# Patient Record
Sex: FEMALE | Race: WHITE | Employment: FULL TIME | URBAN - METROPOLITAN AREA
[De-identification: names, ages, dates, MRNs, and addresses within clinical notes are randomized per-mention and may not be internally consistent; named-entity substitution may affect disease eponyms.]

---

## 2021-12-08 PROBLEM — F41.9 ANXIETY: Status: ACTIVE | Noted: 2021-12-08

## 2021-12-08 PROBLEM — D68.00 VON WILLEBRAND'S DISEASE: Status: ACTIVE | Noted: 2021-12-08

## 2021-12-08 PROBLEM — Z34.80 ENCOUNTER FOR SUPERVISION OF OTHER NORMAL PREGNANCY, UNSPECIFIED TRIMESTER: Status: ACTIVE | Noted: 2021-12-08

## 2021-12-08 PROBLEM — Z15.09 BRCA GENE POSITIVE: Status: ACTIVE | Noted: 2021-12-08

## 2021-12-08 PROBLEM — Z15.01 BRCA GENE POSITIVE: Status: ACTIVE | Noted: 2021-12-08

## 2021-12-08 PROBLEM — Z67.91 RH NEGATIVE STATE IN ANTEPARTUM PERIOD: Status: ACTIVE | Noted: 2021-12-08

## 2021-12-08 PROBLEM — O26.899 RH NEGATIVE STATE IN ANTEPARTUM PERIOD: Status: ACTIVE | Noted: 2021-12-08

## 2021-12-08 PROBLEM — Z87.442 HISTORY OF KIDNEY STONES: Status: ACTIVE | Noted: 2021-12-08

## 2022-02-02 PROBLEM — O43.192: Status: ACTIVE | Noted: 2022-02-02

## 2022-03-15 PROBLEM — D68.00 VON WILLEBRAND'S DISEASE (HCC): Chronic | Status: ACTIVE | Noted: 2021-12-08

## 2022-04-07 PROBLEM — Z34.93 PRENATAL CARE IN THIRD TRIMESTER: Status: ACTIVE | Noted: 2021-12-08

## 2022-04-07 PROBLEM — O43.192 MARGINAL INSERTION OF UMBILICAL CORD AFFECTING MANAGEMENT OF MOTHER IN SECOND TRIMESTER: Chronic | Status: ACTIVE | Noted: 2022-02-02

## 2022-04-15 PROBLEM — R19.5 CHANGE IN CONSISTENCY OF STOOL: Status: ACTIVE | Noted: 2022-04-15

## 2022-11-07 NOTE — PROGRESS NOTES
[facility]  Department of Family Medicine      Patient:  Carlos Zeng 32 y.o. female     Date of Service: 11/7/22      Chief complaint:   Chief Complaint   Patient presents with    Establish Care         History ofPresent Illness   The patient is a 32 y.o. female  presented to the clinic with complaints as above. NP    High risk breast cancer, following with gen surg for this, ordered MRI   -does want to establish with Ob/gyn as needs us q6 months on her ovaries     Hx of von willebrand disease follows with hem onc     Hx of narcolepsy, was on modafinil for this which did help, states she didn't like the side effects and grew a tolerance to this     Works as     States she is having issues with her sugar as she feels like it drops as she feels shaky and dizzy     Past Medical History:      Diagnosis Date    Anxiety 12/8/2021    BRCA2 gene mutation positive 03/2020    GERD (gastroesophageal reflux disease)     History of blood transfusion     tranfused x 6 for von willebrands    IBS (irritable bowel syndrome)     Kidney stone 2016    Psychiatric problem     took effexor and lexapro in past for anxiety    Von Willebrand disease     type 2        PastSurgical History:        Procedure Laterality Date    KIDNEY SURGERY Right     2014 - stent     TONSILLECTOMY      and adenoids     WISDOM TOOTH EXTRACTION         Allergies:    Patient has no known allergies.     Social History:   Social History     Socioeconomic History    Marital status:      Spouse name: Not on file    Number of children: Not on file    Years of education: Not on file    Highest education level: Not on file   Occupational History    Not on file   Tobacco Use    Smoking status: Never    Smokeless tobacco: Never   Substance and Sexual Activity    Alcohol use: Not Currently     Alcohol/week: 0.0 standard drinks    Drug use: No    Sexual activity: Yes     Partners: Male   Other Topics Concern    Not on file   Social History Narrative    Not on file     Social Determinants of Health     Financial Resource Strain: Not on file   Food Insecurity: Not on file   Transportation Needs: Not on file   Physical Activity: Insufficiently Active    Days of Exercise per Week: 1 day    Minutes of Exercise per Session: 30 min   Stress: Not on file   Social Connections: Not on file   Intimate Partner Violence: Not At Risk    Fear of Current or Ex-Partner: No    Emotionally Abused: No    Physically Abused: No    Sexually Abused: No   Housing Stability: Not on file        Family History:       Problem Relation Age of Onset    Heart Disease Father     Prostate Cancer Father     Cancer Father 61        prostate    Breast Cancer Sister 48        pos BRCA 2     Other Sister 50        +brca 2    Other Sister         Positive BRCA 2    Cancer Maternal Grandfather         stomach    Pancreatic Cancer Maternal Aunt        Review of Systems:   Review of Systems - as above     Physical Exam   Vitals: /70   Pulse 71   Temp 97.1 °F (36.2 °C) (Infrared)   Ht 5' 2\" (1.575 m)   LMP 11/07/2022   SpO2 98%   BMI 27.98 kg/m²   Physical Exam  Constitutional:       Appearance: She is well-developed. HENT:      Head: Normocephalic. Cardiovascular:      Rate and Rhythm: Normal rate and regular rhythm. Heart sounds: Normal heart sounds. No murmur heard. Pulmonary:      Effort: Pulmonary effort is normal. No respiratory distress. Breath sounds: Normal breath sounds. No wheezing. Abdominal:      General: Bowel sounds are normal.      Palpations: Abdomen is soft. Musculoskeletal:         General: No tenderness. Normal range of motion. Skin:     General: Skin is warm and dry. Neurological:      Mental Status: She is alert and oriented to person, place, and time. Psychiatric:         Behavior: Behavior normal.           Assessment and Plan       1.  Annual physical exam  Needs lab work and screening as below  - Comprehensive Metabolic Panel; Future  - TSH; Future  - CBC with Auto Differential; Future  - Vitamin D 25 Hydroxy; Future  - Hemoglobin A1C; Future    2. BRCA gene positive  Following with specialists for this, however needs ob/gyn for US of ovaries and monitoring, thus will refer to ob/gyn as below  - Alex Marlow, Julisa Fox MD, OB/GYN, [facility]    3. Primary narcolepsy without cataplexy  Chronic issue  - Diagnosed by previous sleep study, OARS reviewed and follows with what patient is saying, her modafinil is , thus I refilled this and will refer to sleep specialist, advised patient keep an open mind to different treatment for this as she does not like the way she feels on modafinil but does take it to stay awake (adderall? ?)  -Will do further evaluation   - Shanon Cadena MD, Sleep Medicine, [facility]  - modafinil (PROVIGIL) 100 MG tablet; Take 0.5 tablets by mouth daily for 30 days. Dispense: 30 tablet; Refill: 0    4. Hyperglycemia  On previous lab work, will do further work up as patient is having feelings of hypoglycemia (thyroid? ??)  - Comprehensive Metabolic Panel; Future  - TSH; Future  - CBC with Auto Differential; Future  - Vitamin D 25 Hydroxy; Future  - Hemoglobin A1C; Future    Counseled regarding above diagnosis, including possible risks and complications,  especially if left uncontrolled. Counseled regarding the possible side effects, risks, benefits and alternatives to treatment;patient and/or guardian verbalizes understanding, agrees, feels comfortable with and wishes to proceed with above treatment plan. Call or go to [address] if symptoms worsen or persist. Advised patient to call with any new medication issues, and, as applicable, read all Rx info from pharmacy to assure aware of all possible risks and side effects of medicationbefore taking. Patient and/or guardian given opportunity to ask questions/raise concerns.   The patient verbalized comfort and understanding of instructions. I encourage further reading and education about your health conditions. Information on many health conditions is provided by [facility] [employer]: [email or web address]  Please bring any questions to me at your nextvisit. Return to Office: Return in about 3 months (around 2/8/2023) for F/u mood and shakiness (thyroid???) . Medication List:    Current Outpatient Medications   Medication Sig Dispense Refill    modafinil (PROVIGIL) 100 MG tablet Take 0.5 tablets by mouth daily for 30 days. 30 tablet 0    Multiple Vitamin (MULTIVITAMIN ADULT PO) Take by mouth       No current facility-administered medications for this visit. Marilee Whitmore, DO       This document may have been prepared at least partially through the use of voice recognition software. Although effort is taken to assure the accuracy ofthis document, it is possible that grammatical, syntax,  or spelling errors may occur.

## 2022-11-08 ENCOUNTER — OFFICE VISIT (OUTPATIENT)
Dept: PRIMARY CARE CLINIC | Age: 27
End: 2022-11-08
Payer: COMMERCIAL

## 2022-11-08 VITALS
DIASTOLIC BLOOD PRESSURE: 70 MMHG | BODY MASS INDEX: 27.98 KG/M2 | SYSTOLIC BLOOD PRESSURE: 112 MMHG | TEMPERATURE: 97.1 F | HEART RATE: 71 BPM | OXYGEN SATURATION: 98 % | HEIGHT: 62 IN

## 2022-11-08 DIAGNOSIS — Z00.00 ANNUAL PHYSICAL EXAM: Primary | ICD-10-CM

## 2022-11-08 DIAGNOSIS — Z15.09 BRCA GENE POSITIVE: ICD-10-CM

## 2022-11-08 DIAGNOSIS — Z00.00 ANNUAL PHYSICAL EXAM: ICD-10-CM

## 2022-11-08 DIAGNOSIS — G47.419 PRIMARY NARCOLEPSY WITHOUT CATAPLEXY: ICD-10-CM

## 2022-11-08 DIAGNOSIS — Z15.01 BRCA GENE POSITIVE: ICD-10-CM

## 2022-11-08 DIAGNOSIS — R73.9 HYPERGLYCEMIA: ICD-10-CM

## 2022-11-08 LAB
BASOPHILS ABSOLUTE: 0.03 E9/L (ref 0–0.2)
BASOPHILS RELATIVE PERCENT: 0.5 % (ref 0–2)
EOSINOPHILS ABSOLUTE: 0.13 E9/L (ref 0.05–0.5)
EOSINOPHILS RELATIVE PERCENT: 2.4 % (ref 0–6)
HBA1C MFR BLD: 4.9 % (ref 4–5.6)
HCT VFR BLD CALC: 45.1 % (ref 34–48)
HEMOGLOBIN: 13.9 G/DL (ref 11.5–15.5)
IMMATURE GRANULOCYTES #: 0.02 E9/L
IMMATURE GRANULOCYTES %: 0.4 % (ref 0–5)
LYMPHOCYTES ABSOLUTE: 1.81 E9/L (ref 1.5–4)
LYMPHOCYTES RELATIVE PERCENT: 33.2 % (ref 20–42)
MCH RBC QN AUTO: 28 PG (ref 26–35)
MCHC RBC AUTO-ENTMCNC: 30.8 % (ref 32–34.5)
MCV RBC AUTO: 90.7 FL (ref 80–99.9)
MONOCYTES ABSOLUTE: 0.36 E9/L (ref 0.1–0.95)
MONOCYTES RELATIVE PERCENT: 6.6 % (ref 2–12)
NEUTROPHILS ABSOLUTE: 3.11 E9/L (ref 1.8–7.3)
NEUTROPHILS RELATIVE PERCENT: 56.9 % (ref 43–80)
PDW BLD-RTO: 13.5 FL (ref 11.5–15)
PLATELET # BLD: 284 E9/L (ref 130–450)
PMV BLD AUTO: 10.3 FL (ref 7–12)
RBC # BLD: 4.97 E12/L (ref 3.5–5.5)
WBC # BLD: 5.5 E9/L (ref 4.5–11.5)

## 2022-11-08 PROCEDURE — G8484 FLU IMMUNIZE NO ADMIN: HCPCS | Performed by: STUDENT IN AN ORGANIZED HEALTH CARE EDUCATION/TRAINING PROGRAM

## 2022-11-08 PROCEDURE — 99385 PREV VISIT NEW AGE 18-39: CPT | Performed by: STUDENT IN AN ORGANIZED HEALTH CARE EDUCATION/TRAINING PROGRAM

## 2022-11-08 RX ORDER — MODAFINIL 100 MG/1
50 TABLET ORAL DAILY
Qty: 30 TABLET | Refills: 0 | Status: SHIPPED | OUTPATIENT
Start: 2022-11-08 | End: 2022-12-08

## 2022-11-08 SDOH — HEALTH STABILITY: PHYSICAL HEALTH: ON AVERAGE, HOW MANY MINUTES DO YOU ENGAGE IN EXERCISE AT THIS LEVEL?: 30 MIN

## 2022-11-08 SDOH — HEALTH STABILITY: PHYSICAL HEALTH: ON AVERAGE, HOW MANY DAYS PER WEEK DO YOU ENGAGE IN MODERATE TO STRENUOUS EXERCISE (LIKE A BRISK WALK)?: 1 DAY

## 2022-11-08 ASSESSMENT — PATIENT HEALTH QUESTIONNAIRE - PHQ9
SUM OF ALL RESPONSES TO PHQ QUESTIONS 1-9: 0
SUM OF ALL RESPONSES TO PHQ9 QUESTIONS 1 & 2: 0
SUM OF ALL RESPONSES TO PHQ QUESTIONS 1-9: 0
2. FEELING DOWN, DEPRESSED OR HOPELESS: 0
1. LITTLE INTEREST OR PLEASURE IN DOING THINGS: 0
SUM OF ALL RESPONSES TO PHQ QUESTIONS 1-9: 0
SUM OF ALL RESPONSES TO PHQ QUESTIONS 1-9: 0

## 2022-11-16 ENCOUNTER — HOSPITAL ENCOUNTER (OUTPATIENT)
Age: 27
Discharge: HOME | End: 2022-11-16

## 2022-11-16 LAB
ALBUMIN SERPL-MCNC: 4.5 G/DL (ref 3.5–5.2)
ALP BLD-CCNC: 74 U/L (ref 35–104)
ALT SERPL-CCNC: 15 U/L (ref 0–32)
ANION GAP SERPL CALCULATED.3IONS-SCNC: 15 MMOL/L (ref 7–16)
AST SERPL-CCNC: 20 U/L (ref 0–31)
BILIRUB SERPL-MCNC: 0.2 MG/DL (ref 0–1.2)
BUN BLDV-MCNC: 13 MG/DL (ref 6–20)
CALCIUM SERPL-MCNC: 9.3 MG/DL (ref 8.6–10.2)
CHLORIDE BLD-SCNC: 105 MMOL/L (ref 98–107)
CO2: 22 MMOL/L (ref 22–29)
CREAT SERPL-MCNC: 0.7 MG/DL (ref 0.5–1)
GFR SERPL CREATININE-BSD FRML MDRD: >60 ML/MIN/1.73
GLUCOSE BLD-MCNC: 81 MG/DL (ref 74–99)
POTASSIUM SERPL-SCNC: 4.5 MMOL/L (ref 3.5–5)
SODIUM BLD-SCNC: 142 MMOL/L (ref 132–146)
TOTAL PROTEIN: 7.3 G/DL (ref 6.4–8.3)
TSH SERPL DL<=0.05 MIU/L-ACNC: 1.07 UIU/ML (ref 0.27–4.2)
VITAMIN D 25-HYDROXY: 48 NG/ML (ref 30–100)

## 2023-01-02 ASSESSMENT — SOCIAL DETERMINANTS OF HEALTH (SDOH)

## 2023-01-04 ENCOUNTER — OFFICE VISIT (OUTPATIENT)
Dept: OBGYN | Age: 28
End: 2023-01-04
Payer: COMMERCIAL

## 2023-01-04 VITALS
SYSTOLIC BLOOD PRESSURE: 116 MMHG | HEART RATE: 87 BPM | WEIGHT: 151 LBS | HEIGHT: 62 IN | BODY MASS INDEX: 27.79 KG/M2 | DIASTOLIC BLOOD PRESSURE: 62 MMHG

## 2023-01-04 DIAGNOSIS — Z15.01 BRCA2 GENE MUTATION POSITIVE IN FEMALE: ICD-10-CM

## 2023-01-04 DIAGNOSIS — Z15.02 BRCA2 GENE MUTATION POSITIVE IN FEMALE: ICD-10-CM

## 2023-01-04 DIAGNOSIS — Z15.09 BRCA2 GENE MUTATION POSITIVE IN FEMALE: ICD-10-CM

## 2023-01-04 DIAGNOSIS — Z12.4 SCREENING FOR CERVICAL CANCER: Primary | ICD-10-CM

## 2023-01-04 DIAGNOSIS — N93.9 ABNORMAL UTERINE BLEEDING: ICD-10-CM

## 2023-01-04 DIAGNOSIS — Z01.419 ENCOUNTER FOR WELL WOMAN EXAM: ICD-10-CM

## 2023-01-04 PROCEDURE — G8484 FLU IMMUNIZE NO ADMIN: HCPCS | Performed by: OBSTETRICS & GYNECOLOGY

## 2023-01-04 PROCEDURE — 99385 PREV VISIT NEW AGE 18-39: CPT | Performed by: OBSTETRICS & GYNECOLOGY

## 2023-01-04 PROCEDURE — 99203 OFFICE O/P NEW LOW 30 MIN: CPT | Performed by: OBSTETRICS & GYNECOLOGY

## 2023-01-04 RX ORDER — TRANEXAMIC ACID 650 MG/1
1300 TABLET ORAL 3 TIMES DAILY
Qty: 30 TABLET | Refills: 11 | Status: SHIPPED | OUTPATIENT
Start: 2023-01-04 | End: 2023-01-09

## 2023-01-04 RX ORDER — MODAFINIL 100 MG/1
TABLET ORAL
COMMUNITY
Start: 2022-10-04

## 2023-01-04 NOTE — PATIENT INSTRUCTIONS
Please call the number below to schedule your imaging we've requested:  [phone], select option #1

## 2023-01-04 NOTE — PROGRESS NOTES
Yevgeniy George     Patient presents for annual exam.     Patient with BRCA2 mutation. She gets breast screening through a high risk breast surgeon at [facility]. She is scheduled for a breast MRI next month. Patient states her older sister was diagnosed with BRCA2 mutation when she was diagnosed with breast cancer at age 48. She had a sister who  of an overdose at age 43. On autopsy they found cancerous cells in her ovary. Discussed ovarian cancer screening with Ca [facility] and pelvic ultrasound every 6 months starting at age 27. Will order baseline scan now. Patient has heavy cycles. States she has [place]. She has tried multiple OCPs in the past and did not do well on them. She does not want a Mirena. Patient had been on lysteda in the past for control of bleeding. She would like to resume it. Discussed high dose NSAIDs as well.      Past Medical History:   Diagnosis Date    Anxiety 2021    BRCA2 gene mutation positive 2020    GERD (gastroesophageal reflux disease)     History of blood transfusion     tranfused x 6 for von willebrands    IBS (irritable bowel syndrome)     Kidney stone     Psychiatric problem     took effexor and lexapro in past for anxiety    Von Willebrand disease     type 2         Past Surgical History:   Procedure Laterality Date    KIDNEY SURGERY Right     2014 - stent     TONSILLECTOMY      and adenoids     WISDOM TOOTH EXTRACTION          Family History   Problem Relation Age of Onset    Heart Disease Father     Prostate Cancer Father     Cancer Father 61        prostate    Breast Cancer Sister 48        pos BRCA 2     Other Sister 50        +brca 2    Other Sister         Positive BRCA 2    Cancer Maternal Grandfather         stomach    Pancreatic Cancer Maternal Aunt           Current Outpatient Medications:     modafinil (PROVIGIL) 100 MG tablet, , Disp: , Rfl:     tranexamic acid (LYSTEDA) 650 MG TABS tablet, Take 2 tablets by mouth 3 times daily for 5 days Take at start of menses for 5 days. , Disp: 30 tablet, Rfl: 11    Multiple Vitamin (MULTIVITAMIN ADULT PO), Take by mouth, Disp: , Rfl:      No Known Allergies     Social History       Tobacco History       Smoking Status  Never      Smokeless Tobacco Use  Never              Alcohol History       Alcohol Use Status  Yes Drinks/Week  0 Standard drinks or equivalent per week Comment  occ              Drug Use       Drug Use Status  No              Sexual Activity       Sexually Active  Yes Partners  Male                     Vitals:    01/04/23 1411   BP: 116/62   Pulse: 87        Physical Exam:  General: pleasant, alert     Bre[facility]: [facility]     Pelvic exam: normal external genitalia, vulva, vagina, cervix, uterus and adnexa. No uterine or adnexal masses or tenderness. Mary Yo was seen today for new patient and irregular menses. Diagnoses and all orders for this visit:    Screening for cervical cancer  -     PAP SMEAR    Abnormal uterine bleeding  -     tranexamic acid (LYSTEDA) 650 MG TABS tablet; Take 2 tablets by mouth 3 times daily for 5 days Take at start of menses for 5 days. Encounter for well woman exam    BRCA2 gene mutation positive in female  -     US PELVIS COMPLETE; Future    Advised to call with questions or concerns. Return in about 1 year (around 1/4/2024) for Annual, or as needed.      Eric March MD

## 2023-01-04 NOTE — PROGRESS NOTES
New patient alert and pleasant with complaints of heavy bleeding   Here today for annual GYN exam.  Pelvic exam, pap smear obtained, labeled  and delivered to lab. Breast exam completed by doctor. Discharge instructions have been discussed with the patient. Patient advised to call our office with any questions or concerns. Voiced understanding.

## 2023-01-18 DIAGNOSIS — G47.419 NARCOLEPSY WITHOUT CATAPLEXY: ICD-10-CM

## 2023-01-19 ENCOUNTER — HOSPITAL ENCOUNTER (OUTPATIENT)
Dept: ULTRASOUND IMAGING | Age: 28
Discharge: HOME | End: 2023-01-21
Payer: COMMERCIAL

## 2023-01-19 DIAGNOSIS — Z15.02 BRCA2 GENE MUTATION POSITIVE IN FEMALE: ICD-10-CM

## 2023-01-19 DIAGNOSIS — Z15.09 BRCA2 GENE MUTATION POSITIVE IN FEMALE: ICD-10-CM

## 2023-01-19 DIAGNOSIS — Z15.01 BRCA2 GENE MUTATION POSITIVE IN FEMALE: ICD-10-CM

## 2023-01-19 PROCEDURE — 76856 US EXAM PELVIC COMPLETE: CPT

## 2023-01-19 RX ORDER — MODAFINIL 100 MG/1
TABLET ORAL
Qty: 15 TABLET | Refills: 1 | Status: SHIPPED | OUTPATIENT
Start: 2023-01-19 | End: 2023-02-18

## 2023-01-30 NOTE — PROGRESS NOTES
[facility]  Department of Family Medicine      Patient:  Abdelrahman Hernandez 32 y.o. female     Date of Service: 1/31/23        Chief complaint:   Chief Complaint   Patient presents with    3 Month Follow-Up           History ofPresent Illness   The patient is a 32 y.o. female  presented to the clinic with complaints as above. Narcolepsy  -f/u  -referred to sleep specialist, scheduled with them on 3/01/2023  -is taking modafinil PRN, it does help, taking this maybe 3-4 times a week     Mood  -f/u  -has anxiety and depression  -has been on antidepressants in the past, has been 2-3 years since being on them  -recently tried prozac, felt that it may have helped a little   -felt best on Effexor  -is interested in counseling   -gets very anxious, however can feel numb at times   -starts her new job February 20    Past Medical History:      Diagnosis Date    Anxiety 12/8/2021    BRCA2 gene mutation positive 03/2020    GERD (gastroesophageal reflux disease)     History of blood transfusion     tranfused x 6 for von willebrands    IBS (irritable bowel syndrome)     Kidney stone 2016    Psychiatric problem     took effexor and lexapro in past for anxiety    Von Willebrand disease     type 2        PastSurgical History:        Procedure Laterality Date    KIDNEY SURGERY Right     2014 - stent     TONSILLECTOMY      and adenoids     WISDOM TOOTH EXTRACTION         Allergies:    Patient has no known allergies.     Social History:   Social History     Socioeconomic History    Marital status:      Spouse name: Not on file    Number of children: Not on file    Years of education: Not on file    Highest education level: Not on file   Occupational History    Not on file   Tobacco Use    Smoking status: Never    Smokeless tobacco: Never   Vaping Use    Vaping Use: Never used   Substance and Sexual Activity    Alcohol use: Yes     Comment: occ    Drug use: No    Sexual activity: Yes     Partners: Male Other Topics Concern    Not on file   Social History Narrative    Not on file     Social Determinants of Health     Financial Resource Strain: Not on file   Food Insecurity: Not on file   Transportation Needs: Not on file   Physical Activity: Insufficiently Active    Days of Exercise per Week: 1 day    Minutes of Exercise per Session: 30 min   Stress: Not on file   Social Connections: Not on file   Intimate Partner Violence: Not At Risk    Fear of Current or Ex-Partner: No    Emotionally Abused: No    Physically Abused: No    Sexually Abused: No   Housing Stability: Not on file        Family History:       Problem Relation Age of Onset    Heart Disease Father     Prostate Cancer Father     Cancer Father 61        prostate    Breast Cancer Sister 48        pos BRCA 2     Other Sister 50        +brca 2    Other Sister         Positive BRCA 2    Cancer Maternal Grandfather         stomach    Pancreatic Cancer Maternal Aunt        Review of Systems:   Review of Systems - as above     Physical Exam   Vitals: /62   Pulse 62   Temp 96.9 °F (36.1 °C) (Infrared)   Resp 18   Ht 5' 2\" (1.575 m)   Wt 149 lb (67.6 kg)   SpO2 98%   BMI 27.25 kg/m²   Physical Exam  Constitutional:       Appearance: She is well-developed. HENT:      Head: Normocephalic. Cardiovascular:      Rate and Rhythm: Normal rate and regular rhythm. Heart sounds: Normal heart sounds. No murmur heard. Pulmonary:      Effort: Pulmonary effort is normal. No respiratory distress. Breath sounds: Normal breath sounds. No wheezing. Abdominal:      General: Bowel sounds are normal.      Palpations: Abdomen is soft. Musculoskeletal:         General: No tenderness. Normal range of motion. Skin:     General: Skin is warm and dry. Neurological:      Mental Status: She is alert and oriented to person, place, and time. Psychiatric:         Behavior: Behavior normal.           Assessment and Plan       1.  Narcolepsy without cataplexy  F/u of chronic issue  -stable, continue same    2. Anxiety  F/u of chronic issue  -Worsening given life stressors, wants to hold off on medications for now, advised she consider therapy after she starts her new job, will have close f/u in several months to further address as patient may be open to medication at that time     Counseled regarding above diagnosis, including possible risks and complications,  especially if left uncontrolled. Counseled regarding the possible side effects, risks, benefits and alternatives to treatment;patient and/or guardian verbalizes understanding, agrees, feels comfortable with and wishes to proceed with above treatment plan. Call or go to [address] if symptoms worsen or persist. Advised patient to call with any new medication issues, and, as applicable, read all Rx info from pharmacy to assure aware of all possible risks and side effects of medicationbefore taking. Patient and/or guardian given opportunity to ask questions/raise concerns. The patient verbalized comfort and understanding ofinstructions. I encourage further reading and education about your health conditions. Information on many health conditions is provided by [facility] [employer] of Family Physicians: [email or web address]  Please bring any questions to me at your nextvisit. Return to Office: Return in about 2 months (around 4/12/2023) for f/u mood . Medication List:    Current Outpatient Medications   Medication Sig Dispense Refill    modafinil (PROVIGIL) 100 MG tablet TAKE 0.5 TABLETS BY MOUTH DAILY FOR 30 DAYS. 15 tablet 1    tranexamic acid (LYSTEDA) 650 MG TABS tablet Take 2 tablets by mouth 3 times daily for 5 days Take at start of menses for 5 days. 30 tablet 11    Multiple Vitamin (MULTIVITAMIN ADULT PO) Take by mouth (Patient not taking: Reported on 1/31/2023)       No current facility-administered medications for this visit.         Romi Bray, DO       This document may have been prepared at least partially through the use of voice recognition software. Although effort is taken to assure the accuracy ofthis document, it is possible that grammatical, syntax,  or spelling errors may occur.

## 2023-01-31 ENCOUNTER — OFFICE VISIT (OUTPATIENT)
Dept: PRIMARY CARE CLINIC | Age: 28
End: 2023-01-31
Payer: COMMERCIAL

## 2023-01-31 VITALS
WEIGHT: 149 LBS | HEIGHT: 62 IN | BODY MASS INDEX: 27.42 KG/M2 | SYSTOLIC BLOOD PRESSURE: 104 MMHG | DIASTOLIC BLOOD PRESSURE: 62 MMHG | HEART RATE: 62 BPM | TEMPERATURE: 96.9 F | OXYGEN SATURATION: 98 % | RESPIRATION RATE: 18 BRPM

## 2023-01-31 DIAGNOSIS — F41.9 ANXIETY: ICD-10-CM

## 2023-01-31 DIAGNOSIS — G47.419 NARCOLEPSY WITHOUT CATAPLEXY: Primary | ICD-10-CM

## 2023-01-31 PROCEDURE — 1036F TOBACCO NON-USER: CPT | Performed by: STUDENT IN AN ORGANIZED HEALTH CARE EDUCATION/TRAINING PROGRAM

## 2023-01-31 PROCEDURE — 99214 OFFICE O/P EST MOD 30 MIN: CPT | Performed by: STUDENT IN AN ORGANIZED HEALTH CARE EDUCATION/TRAINING PROGRAM

## 2023-01-31 PROCEDURE — G8419 CALC BMI OUT NRM PARAM NOF/U: HCPCS | Performed by: STUDENT IN AN ORGANIZED HEALTH CARE EDUCATION/TRAINING PROGRAM

## 2023-01-31 PROCEDURE — G8427 DOCREV CUR MEDS BY ELIG CLIN: HCPCS | Performed by: STUDENT IN AN ORGANIZED HEALTH CARE EDUCATION/TRAINING PROGRAM

## 2023-01-31 PROCEDURE — G8484 FLU IMMUNIZE NO ADMIN: HCPCS | Performed by: STUDENT IN AN ORGANIZED HEALTH CARE EDUCATION/TRAINING PROGRAM

## 2023-01-31 ASSESSMENT — PATIENT HEALTH QUESTIONNAIRE - PHQ9
SUM OF ALL RESPONSES TO PHQ QUESTIONS 1-9: 18
SUM OF ALL RESPONSES TO PHQ QUESTIONS 1-9: 18
3. TROUBLE FALLING OR STAYING ASLEEP: 3
2. FEELING DOWN, DEPRESSED OR HOPELESS: 2
4. FEELING TIRED OR HAVING LITTLE ENERGY: 3
1. LITTLE INTEREST OR PLEASURE IN DOING THINGS: 2
9. THOUGHTS THAT YOU WOULD BE BETTER OFF DEAD, OR OF HURTING YOURSELF: 0
SUM OF ALL RESPONSES TO PHQ QUESTIONS 1-9: 18
6. FEELING BAD ABOUT YOURSELF - OR THAT YOU ARE A FAILURE OR HAVE LET YOURSELF OR YOUR FAMILY DOWN: 2
SUM OF ALL RESPONSES TO PHQ9 QUESTIONS 1 & 2: 4
5. POOR APPETITE OR OVEREATING: 2
8. MOVING OR SPEAKING SO SLOWLY THAT OTHER PEOPLE COULD HAVE NOTICED. OR THE OPPOSITE, BEING SO FIGETY OR RESTLESS THAT YOU HAVE BEEN MOVING AROUND A LOT MORE THAN USUAL: 2
7. TROUBLE CONCENTRATING ON THINGS, SUCH AS READING THE NEWSPAPER OR WATCHING TELEVISION: 2
SUM OF ALL RESPONSES TO PHQ QUESTIONS 1-9: 18
10. IF YOU CHECKED OFF ANY PROBLEMS, HOW DIFFICULT HAVE THESE PROBLEMS MADE IT FOR YOU TO DO YOUR WORK, TAKE CARE OF THINGS AT HOME, OR GET ALONG WITH OTHER PEOPLE: 1

## 2023-01-31 NOTE — PATIENT INSTRUCTIONS
[facility], [facility]    [address] [facility] [address], [place] [address]    [facility], [address]    Phone: [phone]    Fax: [phone]    Tara Watson MS.Ed., [address] [facility], [facility]. Alma Schreiber.    Phone: [phone]    Fax: [phone] also does the Psychiatric Case Management    [email or web address]         [facility] Emma Vick, [facility], [facility]    [address] [facility]    [phone]         [facility] [address] , [address], [facility], [address]    [phone]         [email or web address]

## 2023-04-06 PROBLEM — O43.192 MARGINAL INSERTION OF UMBILICAL CORD AFFECTING MANAGEMENT OF MOTHER IN SECOND TRIMESTER: Chronic | Status: RESOLVED | Noted: 2022-02-02 | Resolved: 2023-04-06

## 2023-04-06 PROBLEM — Z34.93 PRENATAL CARE IN THIRD TRIMESTER: Status: RESOLVED | Noted: 2021-12-08 | Resolved: 2023-04-06

## 2023-04-06 PROBLEM — R19.5 CHANGE IN CONSISTENCY OF STOOL: Status: RESOLVED | Noted: 2022-04-15 | Resolved: 2023-04-06

## 2023-09-03 ENCOUNTER — APPOINTMENT (OUTPATIENT)
Dept: CT IMAGING | Age: 28
End: 2023-09-03
Payer: COMMERCIAL

## 2023-09-03 ENCOUNTER — HOSPITAL ENCOUNTER (EMERGENCY)
Age: 28
Discharge: HOME | End: 2023-09-03
Attending: EMERGENCY MEDICINE
Payer: COMMERCIAL

## 2023-09-03 VITALS
TEMPERATURE: 97.9 F | DIASTOLIC BLOOD PRESSURE: 63 MMHG | HEART RATE: 57 BPM | OXYGEN SATURATION: 100 % | RESPIRATION RATE: 16 BRPM | SYSTOLIC BLOOD PRESSURE: 99 MMHG

## 2023-09-03 DIAGNOSIS — R10.9 LEFT FLANK PAIN: Primary | ICD-10-CM

## 2023-09-03 DIAGNOSIS — N20.0 KIDNEY STONE: ICD-10-CM

## 2023-09-03 DIAGNOSIS — R31.0 GROSS HEMATURIA: ICD-10-CM

## 2023-09-03 LAB
ANION GAP SERPL CALCULATED.3IONS-SCNC: 9 MMOL/L (ref 7–16)
BACTERIA URNS QL MICRO: ABNORMAL
BASOPHILS # BLD: 0.03 K/UL (ref 0–0.2)
BASOPHILS NFR BLD: 0 % (ref 0–2)
BILIRUB UR QL STRIP: NEGATIVE
BUN SERPL-MCNC: 9 MG/DL (ref 6–20)
CALCIUM SERPL-MCNC: 9.1 MG/DL (ref 8.6–10.2)
CHLORIDE SERPL-SCNC: 104 MMOL/L (ref 98–107)
CLARITY UR: CLEAR
CO2 SERPL-SCNC: 26 MMOL/L (ref 22–29)
COLOR UR: YELLOW
CREAT SERPL-MCNC: 0.8 MG/DL (ref 0.5–1)
EOSINOPHIL # BLD: 0.11 K/UL (ref 0.05–0.5)
EOSINOPHILS RELATIVE PERCENT: 2 % (ref 0–6)
EPI CELLS #/AREA URNS HPF: ABNORMAL /HPF
ERYTHROCYTE [DISTWIDTH] IN BLOOD BY AUTOMATED COUNT: 13 % (ref 11.5–15)
GFR SERPL CREATININE-BSD FRML MDRD: >60 ML/MIN/1.73M2
GLUCOSE SERPL-MCNC: 87 MG/DL (ref 74–99)
GLUCOSE UR STRIP-MCNC: NEGATIVE MG/DL
HCG, URINE, POC: NEGATIVE
HCT VFR BLD AUTO: 42.7 % (ref 34–48)
HGB BLD-MCNC: 13.4 G/DL (ref 11.5–15.5)
HGB UR QL STRIP.AUTO: ABNORMAL
IMM GRANULOCYTES # BLD AUTO: <0.03 K/UL (ref 0–0.58)
IMM GRANULOCYTES NFR BLD: 0 % (ref 0–5)
KETONES UR STRIP-MCNC: NEGATIVE MG/DL
LACTATE BLDV-SCNC: 0.7 MMOL/L (ref 0.5–2.2)
LEUKOCYTE ESTERASE UR QL STRIP: ABNORMAL
LYMPHOCYTES NFR BLD: 2.02 K/UL (ref 1.5–4)
LYMPHOCYTES RELATIVE PERCENT: 29 % (ref 20–42)
Lab: NORMAL
MCH RBC QN AUTO: 27.5 PG (ref 26–35)
MCHC RBC AUTO-ENTMCNC: 31.4 G/DL (ref 32–34.5)
MCV RBC AUTO: 87.5 FL (ref 80–99.9)
MONOCYTES NFR BLD: 0.49 K/UL (ref 0.1–0.95)
MONOCYTES NFR BLD: 7 % (ref 2–12)
NEGATIVE QC PASS/FAIL: NORMAL
NEUTROPHILS NFR BLD: 62 % (ref 43–80)
NEUTS SEG NFR BLD: 4.38 K/UL (ref 1.8–7.3)
NITRITE UR QL STRIP: NEGATIVE
PH UR STRIP: 6 [PH] (ref 5–9)
PLATELET # BLD AUTO: 251 K/UL (ref 130–450)
PMV BLD AUTO: 10 FL (ref 7–12)
POSITIVE QC PASS/FAIL: NORMAL
POTASSIUM SERPL-SCNC: 4.2 MMOL/L (ref 3.5–5)
PROT UR STRIP-MCNC: NEGATIVE MG/DL
RBC # BLD AUTO: 4.88 M/UL (ref 3.5–5.5)
RBC #/AREA URNS HPF: ABNORMAL /HPF
SODIUM SERPL-SCNC: 139 MMOL/L (ref 132–146)
SP GR UR STRIP: <1.005 (ref 1–1.03)
UROBILINOGEN UR STRIP-ACNC: 0.2 EU/DL (ref 0–1)
WBC #/AREA URNS HPF: ABNORMAL /HPF
WBC OTHER # BLD: 7.1 K/UL (ref 4.5–11.5)

## 2023-09-03 PROCEDURE — 74176 CT ABD & PELVIS W/O CONTRAST: CPT

## 2023-09-03 PROCEDURE — 80048 BASIC METABOLIC PNL TOTAL CA: CPT

## 2023-09-03 PROCEDURE — 83605 ASSAY OF LACTIC ACID: CPT

## 2023-09-03 PROCEDURE — 2580000003 HC RX 258: Performed by: EMERGENCY MEDICINE

## 2023-09-03 PROCEDURE — 96374 THER/PROPH/DIAG INJ IV PUSH: CPT

## 2023-09-03 PROCEDURE — 87086 URINE CULTURE/COLONY COUNT: CPT

## 2023-09-03 PROCEDURE — 6370000000 HC RX 637 (ALT 250 FOR IP): Performed by: EMERGENCY MEDICINE

## 2023-09-03 PROCEDURE — 6360000002 HC RX W HCPCS: Performed by: EMERGENCY MEDICINE

## 2023-09-03 PROCEDURE — 99284 EMERGENCY DEPT VISIT MOD MDM: CPT

## 2023-09-03 PROCEDURE — 81001 URINALYSIS AUTO W/SCOPE: CPT

## 2023-09-03 PROCEDURE — 85025 COMPLETE CBC W/AUTO DIFF WBC: CPT

## 2023-09-03 RX ORDER — OXYCODONE HYDROCHLORIDE AND ACETAMINOPHEN 5; 325 MG/1; MG/1
1 TABLET ORAL EVERY 6 HOURS PRN
Qty: 10 TABLET | Refills: 0 | Status: SHIPPED | OUTPATIENT
Start: 2023-09-03 | End: 2023-09-06

## 2023-09-03 RX ORDER — 0.9 % SODIUM CHLORIDE 0.9 %
1000 INTRAVENOUS SOLUTION INTRAVENOUS ONCE
Status: COMPLETED | OUTPATIENT
Start: 2023-09-03 | End: 2023-09-03

## 2023-09-03 RX ORDER — ONDANSETRON 4 MG/1
4 TABLET, FILM COATED ORAL EVERY 8 HOURS PRN
Qty: 12 TABLET | Refills: 0 | Status: SHIPPED | OUTPATIENT
Start: 2023-09-03 | End: 2023-09-08

## 2023-09-03 RX ORDER — TAMSULOSIN HYDROCHLORIDE 0.4 MG/1
0.4 CAPSULE ORAL DAILY
Qty: 14 CAPSULE | Refills: 0 | Status: SHIPPED | OUTPATIENT
Start: 2023-09-03 | End: 2023-09-17

## 2023-09-03 RX ORDER — KETOROLAC TROMETHAMINE 30 MG/ML
15 INJECTION, SOLUTION INTRAMUSCULAR; INTRAVENOUS ONCE
Status: COMPLETED | OUTPATIENT
Start: 2023-09-03 | End: 2023-09-03

## 2023-09-03 RX ORDER — TAMSULOSIN HYDROCHLORIDE 0.4 MG/1
0.4 CAPSULE ORAL DAILY
Status: DISCONTINUED | OUTPATIENT
Start: 2023-09-03 | End: 2023-09-03 | Stop reason: HOSPADM

## 2023-09-03 RX ADMIN — TAMSULOSIN HYDROCHLORIDE 0.4 MG: 0.4 CAPSULE ORAL at 11:26

## 2023-09-03 RX ADMIN — KETOROLAC TROMETHAMINE 15 MG: 30 INJECTION, SOLUTION INTRAMUSCULAR; INTRAVENOUS at 08:47

## 2023-09-03 RX ADMIN — SODIUM CHLORIDE 1000 ML: 9 INJECTION, SOLUTION INTRAVENOUS at 08:45

## 2023-09-03 ASSESSMENT — PAIN DESCRIPTION - DESCRIPTORS: DESCRIPTORS: ACHING

## 2023-09-03 ASSESSMENT — PAIN DESCRIPTION - LOCATION
LOCATION: ABDOMEN;FLANK
LOCATION: FLANK

## 2023-09-03 ASSESSMENT — PAIN - FUNCTIONAL ASSESSMENT: PAIN_FUNCTIONAL_ASSESSMENT: 0-10

## 2023-09-03 ASSESSMENT — PAIN DESCRIPTION - ORIENTATION
ORIENTATION: LEFT
ORIENTATION: LEFT

## 2023-09-03 ASSESSMENT — PAIN SCALES - GENERAL
PAINLEVEL_OUTOF10: 8
PAINLEVEL_OUTOF10: 4

## 2023-09-04 LAB
MICROORGANISM SPEC CULT: NO GROWTH
SPECIMEN DESCRIPTION: NORMAL

## 2024-01-30 ENCOUNTER — HOSPITAL ENCOUNTER (EMERGENCY)
Age: 29
Discharge: HOME | End: 2024-01-31
Attending: STUDENT IN AN ORGANIZED HEALTH CARE EDUCATION/TRAINING PROGRAM
Payer: COMMERCIAL

## 2024-01-30 DIAGNOSIS — O03.9 MISCARRIAGE: Primary | ICD-10-CM

## 2024-01-30 LAB
ABO + RH BLD: NORMAL
ANION GAP SERPL CALCULATED.3IONS-SCNC: 8 MMOL/L (ref 7–16)
B-HCG SERPL EIA 3RD IS-ACNC: 19.5 MIU/ML (ref 0–7)
BACTERIA URNS QL MICRO: ABNORMAL
BASOPHILS # BLD: 0.05 K/UL (ref 0–0.2)
BASOPHILS NFR BLD: 1 % (ref 0–2)
BILIRUB UR QL STRIP: NEGATIVE
BLOOD GROUP ANTIBODIES SERPL: NEGATIVE
BUN SERPL-MCNC: 10 MG/DL (ref 6–20)
CALCIUM SERPL-MCNC: 9.4 MG/DL (ref 8.6–10.2)
CHLORIDE SERPL-SCNC: 103 MMOL/L (ref 98–107)
CLARITY UR: CLEAR
CO2 SERPL-SCNC: 24 MMOL/L (ref 22–29)
COLOR UR: ABNORMAL
CREAT SERPL-MCNC: 0.7 MG/DL (ref 0.5–1)
EOSINOPHIL # BLD: 0.21 K/UL (ref 0.05–0.5)
EOSINOPHILS RELATIVE PERCENT: 2 % (ref 0–6)
EPI CELLS #/AREA URNS HPF: ABNORMAL /HPF
ERYTHROCYTE [DISTWIDTH] IN BLOOD BY AUTOMATED COUNT: 12.9 % (ref 11.5–15)
GFR SERPL CREATININE-BSD FRML MDRD: >60 ML/MIN/1.73M2
GLUCOSE SERPL-MCNC: 113 MG/DL (ref 74–99)
GLUCOSE UR STRIP-MCNC: NEGATIVE MG/DL
HCT VFR BLD AUTO: 41.6 % (ref 34–48)
HGB BLD-MCNC: 13.3 G/DL (ref 11.5–15.5)
HGB UR QL STRIP.AUTO: ABNORMAL
IMM GRANULOCYTES # BLD AUTO: 0.03 K/UL (ref 0–0.58)
IMM GRANULOCYTES NFR BLD: 0 % (ref 0–5)
KETONES UR STRIP-MCNC: NEGATIVE MG/DL
LEUKOCYTE ESTERASE UR QL STRIP: ABNORMAL
LYMPHOCYTES NFR BLD: 3.46 K/UL (ref 1.5–4)
LYMPHOCYTES RELATIVE PERCENT: 36 % (ref 20–42)
MCH RBC QN AUTO: 27.7 PG (ref 26–35)
MCHC RBC AUTO-ENTMCNC: 32 G/DL (ref 32–34.5)
MCV RBC AUTO: 86.7 FL (ref 80–99.9)
MONOCYTES NFR BLD: 0.52 K/UL (ref 0.1–0.95)
MONOCYTES NFR BLD: 6 % (ref 2–12)
NEUTROPHILS NFR BLD: 55 % (ref 43–80)
NEUTS SEG NFR BLD: 5.26 K/UL (ref 1.8–7.3)
NITRITE UR QL STRIP: NEGATIVE
PH UR STRIP: 6 [PH] (ref 5–9)
PLATELET # BLD AUTO: 265 K/UL (ref 130–450)
PMV BLD AUTO: 10 FL (ref 7–12)
POTASSIUM SERPL-SCNC: 3.9 MMOL/L (ref 3.5–5)
PROT UR STRIP-MCNC: NEGATIVE MG/DL
RBC # BLD AUTO: 4.8 M/UL (ref 3.5–5.5)
RBC #/AREA URNS HPF: ABNORMAL /HPF
SODIUM SERPL-SCNC: 135 MMOL/L (ref 132–146)
SP GR UR STRIP: 1.01 (ref 1–1.03)
UROBILINOGEN UR STRIP-ACNC: 0.2 EU/DL (ref 0–1)
WBC #/AREA URNS HPF: ABNORMAL /HPF
WBC OTHER # BLD: 9.5 K/UL (ref 4.5–11.5)

## 2024-01-30 PROCEDURE — 99283 EMERGENCY DEPT VISIT LOW MDM: CPT

## 2024-01-30 PROCEDURE — 86850 RBC ANTIBODY SCREEN: CPT

## 2024-01-30 PROCEDURE — 84702 CHORIONIC GONADOTROPIN TEST: CPT

## 2024-01-30 PROCEDURE — 80048 BASIC METABOLIC PNL TOTAL CA: CPT

## 2024-01-30 PROCEDURE — 86901 BLOOD TYPING SEROLOGIC RH(D): CPT

## 2024-01-30 PROCEDURE — 85025 COMPLETE CBC W/AUTO DIFF WBC: CPT

## 2024-01-30 PROCEDURE — 81001 URINALYSIS AUTO W/SCOPE: CPT

## 2024-01-30 PROCEDURE — 86900 BLOOD TYPING SEROLOGIC ABO: CPT

## 2024-01-30 ASSESSMENT — LIFESTYLE VARIABLES
HOW MANY STANDARD DRINKS CONTAINING ALCOHOL DO YOU HAVE ON A TYPICAL DAY: PATIENT DOES NOT DRINK
HOW OFTEN DO YOU HAVE A DRINK CONTAINING ALCOHOL: NEVER

## 2024-01-30 ASSESSMENT — PAIN DESCRIPTION - FREQUENCY: FREQUENCY: CONTINUOUS

## 2024-01-30 ASSESSMENT — PAIN - FUNCTIONAL ASSESSMENT: PAIN_FUNCTIONAL_ASSESSMENT: 0-10

## 2024-01-30 ASSESSMENT — PAIN DESCRIPTION - PAIN TYPE: TYPE: ACUTE PAIN

## 2024-01-30 ASSESSMENT — PAIN SCALES - GENERAL: PAINLEVEL_OUTOF10: 6

## 2024-01-30 ASSESSMENT — PAIN DESCRIPTION - DESCRIPTORS: DESCRIPTORS: CRAMPING

## 2024-01-30 ASSESSMENT — PAIN DESCRIPTION - LOCATION: LOCATION: ABDOMEN

## 2024-01-30 ASSESSMENT — PAIN DESCRIPTION - ONSET: ONSET: ON-GOING

## 2024-01-30 NOTE — ED NOTES
Department of Emergency Medicine  FIRST PROVIDER TRIAGE NOTE             Independent MLP           1/30/24  6:11 PM EST    Date of Encounter: 1/30/24   MRN: [id number]      HPI: Farhana Hughes is a 28 y.o. female who presents to the ED for Abdominal Cramping (6 weeks pregnant) and Vaginal Bleeding (Onset 3 hours PTA)  Patient states that she began to have vaginal spotting yesterday, was seen at the OB/GYN and they did an ultrasound and checked her hCG level.  She states today the cramping and bleeding has gotten worse.  She states that she is going through about a pad every 1/2 hour.  Denies chest pain or shortness of breath.  Patient is G3, P1. Follows with Dr. Soliman    ROS: Negative for cp or sob.    PE: Gen Appearance/Constitutional: alert  CV: regular rate     Initial Plan of Care: All treatment areas with department are currently occupied. Plan to order/Initiate the following while awaiting opening in ED: labs.  Initiate Treatment-Testing, Proceed toTreatment Area When Bed Available for ED Attending/MLP to Continue Care    Electronically signed by EDWIN Guerrero CNP   DD: 1/30/24       Analy Swenson APRN - CNP  01/30/24 [id number]

## 2024-01-31 VITALS
DIASTOLIC BLOOD PRESSURE: 72 MMHG | OXYGEN SATURATION: 98 % | TEMPERATURE: 98.3 F | RESPIRATION RATE: 16 BRPM | HEIGHT: 62 IN | SYSTOLIC BLOOD PRESSURE: 122 MMHG | BODY MASS INDEX: 26.13 KG/M2 | WEIGHT: 142 LBS | HEART RATE: 82 BPM

## 2024-01-31 NOTE — ED PROVIDER NOTES
[facility] EMERGENCY DEPARTMENT  EMERGENCY DEPARTMENT ENCOUNTER        Pt Name: Farhana Hughes  MRN: [id number]  Birthdate 1995  Date of evaluation: 1/30/2024  Provider: Carmela Bates DO  PCP: Rolando Wu DO  Note Started: 11:29 PM EST 1/30/24    CHIEF COMPLAINT       Chief Complaint   Patient presents with    Abdominal Cramping     6 weeks pregnant    Vaginal Bleeding     Onset 3 hours PTA       HISTORY OF PRESENT ILLNESS: 1 or more Elements   History From: Patient    Limitations to history : None  Social Determinants : None    Farhana Hughes is a 28 y.o. female who presents for abdominal cramping and vaginal bleeding.  Patient states that she was 6 weeks pregnant.  She states that her vaginal bleeding started yesterday.  She was seen in OB/GYN office yesterday when ultrasound was performed.  She states that she has had increased bleeding and abdominal cramping.  She has had miscarriages in the past.  She has history of von Willebrand disease.  She has only been going through a pad every few hours.  Denies any fever, chills, n/v, headache, dizziness, vision changes, neck tenderness or stiffness, weakness, cp, palpitations, leg swelling/tenderness, sob, cough, dysuria, hematuria, diarrhea, constipation, bloody stools.    Nursing Notes were all reviewed and agreed with or any disagreements were addressed in the HPI.    ROS:   Pertinent positives and negatives are stated within HPI, all other systems reviewed and are negative.      --------------------------------------------- PAST HISTORY ---------------------------------------------  Past Medical History:  has a past medical history of Anxiety, BRCA2 gene mutation positive, GERD (gastroesophageal reflux disease), History of blood transfusion, IBS (irritable bowel syndrome), Kidney stone, Psychiatric problem, and Von Willebrand disease (HCC).    Past Surgical History:  has a past surgical history that includes

## 2024-01-31 NOTE — ED NOTES
Pt was administered Rhogam in her L buttocks via IM injection.     O negative  [id number]  Exp 12/20/2025

## 2024-01-31 NOTE — DISCHARGE INSTRUCTIONS
Please return to the ER for any new or worsening symptoms including but not limited to Fever, Chest pain or difficulty breathing, or Worsening abdominal pain  If prescribed, please be sure to  your prescriptions from the pharmacy  Please follow-up with Primary care provider and OB/GYN as instructed

## 2024-02-01 LAB
COMPONENT: NORMAL
STATUS OF UNITS: NORMAL
TRANSFUSION STATUS: NORMAL
UNIT DIVISION: 0
UNIT NUMBER: NORMAL

## 2024-02-03 ENCOUNTER — HOSPITAL ENCOUNTER (OUTPATIENT)
Age: 29
Discharge: HOME | End: 2024-02-03
Payer: COMMERCIAL

## 2024-02-03 DIAGNOSIS — O03.9 SAB (SPONTANEOUS ABORTION): ICD-10-CM

## 2024-02-03 LAB — B-HCG SERPL EIA 3RD IS-ACNC: 8.6 MIU/ML (ref 0–7)

## 2024-02-03 PROCEDURE — 84702 CHORIONIC GONADOTROPIN TEST: CPT

## 2024-02-03 PROCEDURE — 36415 COLL VENOUS BLD VENIPUNCTURE: CPT

## 2024-02-13 ENCOUNTER — HOSPITAL ENCOUNTER (OUTPATIENT)
Age: 29
Discharge: HOME | End: 2024-02-13
Payer: COMMERCIAL

## 2024-02-13 DIAGNOSIS — N96 RECURRENT PREGNANCY LOSS: ICD-10-CM

## 2024-02-13 LAB
ESTRADIOL LEVEL: 219.7 PG/ML
FSH SERPL-ACNC: 3.7 MIU/ML
HCYS SERPL-SCNC: 8.6 UMOL/L (ref 0–15)
TSH SERPL DL<=0.05 MIU/L-ACNC: 1.41 UIU/ML (ref 0.27–4.2)

## 2024-02-13 PROCEDURE — 86147 CARDIOLIPIN ANTIBODY EA IG: CPT

## 2024-02-13 PROCEDURE — 81240 F2 GENE: CPT

## 2024-02-13 PROCEDURE — 81400 MOPATH PROCEDURE LEVEL 1: CPT

## 2024-02-13 PROCEDURE — 81241 F5 GENE: CPT

## 2024-02-13 PROCEDURE — 85306 CLOT INHIBIT PROT S FREE: CPT

## 2024-02-13 PROCEDURE — 83001 ASSAY OF GONADOTROPIN (FSH): CPT

## 2024-02-13 PROCEDURE — 85303 CLOT INHIBIT PROT C ACTIVITY: CPT

## 2024-02-13 PROCEDURE — 83090 ASSAY OF HOMOCYSTEINE: CPT

## 2024-02-13 PROCEDURE — 36415 COLL VENOUS BLD VENIPUNCTURE: CPT

## 2024-02-13 PROCEDURE — 86146 BETA-2 GLYCOPROTEIN ANTIBODY: CPT

## 2024-02-13 PROCEDURE — 84443 ASSAY THYROID STIM HORMONE: CPT

## 2024-02-13 PROCEDURE — 81291 MTHFR GENE: CPT

## 2024-02-13 PROCEDURE — 82670 ASSAY OF TOTAL ESTRADIOL: CPT

## 2024-02-14 LAB — PROTEIN C ACTIVITY: 87 % (ref 68–165)

## 2024-02-16 LAB
B2 GLYCOPROT1 IGG SERPL IA-ACNC: 1.4 ELISA U/ML (ref 0–7)
B2 GLYCOPROT1 IGM SERPL IA-ACNC: 3.8 ELISA U/ML (ref 0–7)
CARDIOLIPIN IGA SER IA-ACNC: 4.2 APL (ref 0–14)
CARDIOLIPIN IGG SER IA-ACNC: 0.9 GPL (ref 0–10)
CARDIOLIPIN IGM SER IA-ACNC: 1.2 MPL (ref 0–10)

## 2024-02-17 LAB
F2 C.20210G>A GENO BLD/T: NEGATIVE
FACTOR XIII (F13A1) V34L VARIANT: NEGATIVE
FACTOR XIII VARIANT SPECIMEN: NORMAL
MTHFR INTERPRETATION: NORMAL
MTHFR MUTATION A1286C: NORMAL
MTHFR MUTATION C665T: NEGATIVE
SPECIMEN SOURCE: NORMAL
SPECIMEN: NORMAL

## 2024-02-19 LAB
F5 P.R506Q BLD/T QL: NEGATIVE
SPECIMEN SOURCE: NORMAL

## 2024-02-20 LAB
PAI-1 INTERPRETATION: ABNORMAL
PLASMINOGEN ACT. INHIBITOR-1 (PAI-1) SPECIMEN: ABNORMAL

## 2024-02-21 LAB — PROTEIN S ACTIVITY: 86 % (ref 59–130)

## 2024-03-12 ENCOUNTER — HOSPITAL ENCOUNTER (OUTPATIENT)
Age: 29
Discharge: HOME | End: 2024-03-12
Payer: COMMERCIAL

## 2024-03-12 DIAGNOSIS — O26.851 SPOTTING AFFECTING PREGNANCY IN FIRST TRIMESTER: ICD-10-CM

## 2024-03-12 LAB — B-HCG SERPL EIA 3RD IS-ACNC: 3064 MIU/ML (ref 0–7)

## 2024-03-12 PROCEDURE — 84702 CHORIONIC GONADOTROPIN TEST: CPT

## 2024-03-12 PROCEDURE — 36415 COLL VENOUS BLD VENIPUNCTURE: CPT

## 2024-03-14 ENCOUNTER — OFFICE VISIT (OUTPATIENT)
Dept: ONCOLOGY | Age: 29
End: 2024-03-14
Payer: COMMERCIAL

## 2024-03-14 ENCOUNTER — HOSPITAL ENCOUNTER (OUTPATIENT)
Dept: INFUSION THERAPY | Age: 29
Discharge: HOME | End: 2024-03-14

## 2024-03-14 VITALS
OXYGEN SATURATION: 99 % | WEIGHT: 141.2 LBS | HEART RATE: 107 BPM | DIASTOLIC BLOOD PRESSURE: 75 MMHG | HEIGHT: 62 IN | SYSTOLIC BLOOD PRESSURE: 112 MMHG | BODY MASS INDEX: 25.98 KG/M2 | TEMPERATURE: 97.6 F

## 2024-03-14 DIAGNOSIS — D68.00 VON WILLEBRAND DISEASE (HCC): Primary | ICD-10-CM

## 2024-03-14 PROCEDURE — 99214 OFFICE O/P EST MOD 30 MIN: CPT

## 2024-03-14 PROCEDURE — 99205 OFFICE O/P NEW HI 60 MIN: CPT | Performed by: INTERNAL MEDICINE

## 2024-03-14 NOTE — PROGRESS NOTES
Farhana Hughes  1995 28 y.o.      Referring Physician:     PCP: Rolando Wu DO    Vitals:    24 0801   BP: 112/75   Pulse: (!) 107   Temp: 97.6 °F (36.4 °C)   SpO2: 99%        Wt Readings from Last 3 Encounters:   24 64 kg (141 lb 3.2 oz)   24 63.6 kg (140 lb 3.2 oz)   24 64.4 kg (142 lb)        Body mass index is 25.83 kg/m².          Chief Complaint:   Chief Complaint   Patient presents with    New Patient     Von Willebrand disease           LMP: pregnant    Age at first Menses: 12    : 4    Para: 1          Current Outpatient Medications:     Gtoaru-KrKzyc-KR-DHA w/o Vit A (PRENA 1 TRUE) 30-1.4 & 300 MG MISC, Take by mouth, Disp: , Rfl:     tamsulosin (FLOMAX) 0.4 MG capsule, Take 1 capsule by mouth daily for 14 days, Disp: 14 capsule, Rfl: 0    tranexamic acid (LYSTEDA) 650 MG TABS tablet, Take 2 tablets by mouth 3 times daily for 5 days Take at start of menses for 5 days., Disp: 30 tablet, Rfl: 11    Multiple Vitamin (MULTIVITAMIN ADULT PO), Take by mouth (Patient not taking: Reported on 2023), Disp: , Rfl:        Past Medical History:   Diagnosis Date    Anxiety 2021    BRCA2 gene mutation positive 2020    GERD (gastroesophageal reflux disease)     History of blood transfusion     tranfused x 6 for von willebrands    IBS (irritable bowel syndrome)     Kidney stone 2016    Psychiatric problem     took effexor and lexapro in past for anxiety    Von Willebrand disease (HCC)     type 2        Past Surgical History:   Procedure Laterality Date    KIDNEY SURGERY Right     2014 - stent     TONSILLECTOMY      and adenoids     WISDOM TOOTH EXTRACTION         Family History   Problem Relation Age of Onset    Heart Disease Father     Prostate Cancer Father     Cancer Father 60        prostate    Breast Cancer Sister 50        pos BRCA 2     Other Sister 48        +brca 2    Other Sister         Positive BRCA 2    Cancer Maternal Grandfather         stomach    
Patient refused printed AVS, pt states they have MYCHART. All questions answered.     
setting of known vWD type 2N and early non consecutive miscarriages x 2. Due to increased risk of bleeding carried w/ her vWD diagnosis I would hold off on initiating ASA until the case is discussed with the physician she will see at the high risk OB clinic in April and her risk of preeclampsia is assessed. There is a dispute of the correlation between JHON-1 polymorphism and preeclampsia. Gerhardt et al. (2005) discovered that women with JHON-1 5G/5G genotype are at risk for early onset of severe preeclampsia (17-35 gestational weeks). Kaushal LANZA et al. (2013) found that JHON-1 4G/5G polymorphism is not associated with preeclampsia with a total of 5003 women involved in the meta-analysis.  The role of JHON-1(4G/5G) polymorphisms in recurrent pregnancy loss is also controversial. Of note recurrent pregnancy loss is defined as the occurrence of consecutive pregnancy losses before the 20th week of gestation with the same partner.   In discussion with the OB clinic can consider either not adding ASA to her treatment plan vs. Re-checking her VWD studies at 28 weeks and consider adding ASA at that time if results are close to normal limits.   2.vWD Type 2N. Baseline studies as of 9/27/23:  F VIII activity 37%  vWF Ag 48%  vWF activity 30   Will re-check levels at 28wks and 34wks of gestation.     F/u in 3m. Labs prior.     Cass Workman MD  3/14/2024      
How Many Skin Cancers Have You Had?: more than one
What Is The Reason For Today's Visit?: History of Non-Melanoma Skin Cancer
When Was Your Last Cancer Diagnosed?: 2020

## 2024-04-06 ENCOUNTER — HOSPITAL ENCOUNTER (EMERGENCY)
Age: 29
Discharge: HOME | End: 2024-04-06
Payer: COMMERCIAL

## 2024-04-06 ENCOUNTER — APPOINTMENT (OUTPATIENT)
Dept: ULTRASOUND IMAGING | Age: 29
End: 2024-04-06
Payer: COMMERCIAL

## 2024-04-06 VITALS
OXYGEN SATURATION: 100 % | TEMPERATURE: 98.2 F | DIASTOLIC BLOOD PRESSURE: 72 MMHG | BODY MASS INDEX: 26.13 KG/M2 | HEIGHT: 62 IN | HEART RATE: 89 BPM | SYSTOLIC BLOOD PRESSURE: 106 MMHG | RESPIRATION RATE: 16 BRPM | WEIGHT: 142 LBS

## 2024-04-06 DIAGNOSIS — O20.8 SUBCHORIONIC HEMORRHAGE OF PLACENTA IN FIRST TRIMESTER: ICD-10-CM

## 2024-04-06 DIAGNOSIS — Z67.91 RH NEGATIVE STATE IN ANTEPARTUM PERIOD, FIRST TRIMESTER: ICD-10-CM

## 2024-04-06 DIAGNOSIS — O46.90 VAGINAL BLEEDING IN PREGNANCY: Primary | ICD-10-CM

## 2024-04-06 DIAGNOSIS — O26.891 RH NEGATIVE STATE IN ANTEPARTUM PERIOD, FIRST TRIMESTER: ICD-10-CM

## 2024-04-06 DIAGNOSIS — Z29.13 NEED FOR RHOGAM DUE TO RH NEGATIVE MOTHER: ICD-10-CM

## 2024-04-06 LAB
ABO + RH BLD: NORMAL
ALBUMIN SERPL-MCNC: 4.4 G/DL (ref 3.5–5.2)
ALP SERPL-CCNC: 68 U/L (ref 35–104)
ALT SERPL-CCNC: 12 U/L (ref 0–32)
ANION GAP SERPL CALCULATED.3IONS-SCNC: 11 MMOL/L (ref 7–16)
ANTIBODY IDENTIFICATION: NORMAL
AST SERPL-CCNC: 20 U/L (ref 0–31)
B-HCG SERPL EIA 3RD IS-ACNC: ABNORMAL MIU/ML (ref 0–7)
BASOPHILS # BLD: 0.04 K/UL (ref 0–0.2)
BASOPHILS NFR BLD: 1 % (ref 0–2)
BILIRUB SERPL-MCNC: <0.2 MG/DL (ref 0–1.2)
BILIRUB UR QL STRIP: NEGATIVE
BLOOD GROUP ANTIBODIES SERPL: POSITIVE
BUN SERPL-MCNC: 8 MG/DL (ref 6–20)
CALCIUM SERPL-MCNC: 9.6 MG/DL (ref 8.6–10.2)
CHLORIDE SERPL-SCNC: 104 MMOL/L (ref 98–107)
CLARITY UR: CLEAR
CO2 SERPL-SCNC: 23 MMOL/L (ref 22–29)
COLOR UR: YELLOW
CREAT SERPL-MCNC: 0.5 MG/DL (ref 0.5–1)
DAT POLY-SP REAG RBC QL: NEGATIVE
EOSINOPHIL # BLD: 0.17 K/UL (ref 0.05–0.5)
EOSINOPHILS RELATIVE PERCENT: 3 % (ref 0–6)
ERYTHROCYTE [DISTWIDTH] IN BLOOD BY AUTOMATED COUNT: 13.3 % (ref 11.5–15)
GFR SERPL CREATININE-BSD FRML MDRD: >90 ML/MIN/1.73M2
GLUCOSE SERPL-MCNC: 114 MG/DL (ref 74–99)
GLUCOSE UR STRIP-MCNC: NEGATIVE MG/DL
HCT VFR BLD AUTO: 41.6 % (ref 34–48)
HGB BLD-MCNC: 13.5 G/DL (ref 11.5–15.5)
HGB UR QL STRIP.AUTO: ABNORMAL
IMM GRANULOCYTES # BLD AUTO: <0.03 K/UL (ref 0–0.58)
IMM GRANULOCYTES NFR BLD: 0 % (ref 0–5)
KETONES UR STRIP-MCNC: 15 MG/DL
LEUKOCYTE ESTERASE UR QL STRIP: NEGATIVE
LYMPHOCYTES NFR BLD: 1.76 K/UL (ref 1.5–4)
LYMPHOCYTES RELATIVE PERCENT: 25 % (ref 20–42)
MCH RBC QN AUTO: 27.7 PG (ref 26–35)
MCHC RBC AUTO-ENTMCNC: 32.5 G/DL (ref 32–34.5)
MCV RBC AUTO: 85.2 FL (ref 80–99.9)
MONOCYTES NFR BLD: 0.71 K/UL (ref 0.1–0.95)
MONOCYTES NFR BLD: 10 % (ref 2–12)
MUCOUS THREADS URNS QL MICRO: PRESENT
NEUTROPHILS NFR BLD: 61 % (ref 43–80)
NEUTS SEG NFR BLD: 4.22 K/UL (ref 1.8–7.3)
NITRITE UR QL STRIP: NEGATIVE
PH UR STRIP: 6 [PH] (ref 5–9)
PLATELET # BLD AUTO: 246 K/UL (ref 130–450)
PMV BLD AUTO: 9.7 FL (ref 7–12)
POTASSIUM SERPL-SCNC: 4.5 MMOL/L (ref 3.5–5)
PROT SERPL-MCNC: 7.8 G/DL (ref 6.4–8.3)
PROT UR STRIP-MCNC: NEGATIVE MG/DL
RBC # BLD AUTO: 4.88 M/UL (ref 3.5–5.5)
RBC #/AREA URNS HPF: ABNORMAL /HPF
SODIUM SERPL-SCNC: 138 MMOL/L (ref 132–146)
SP GR UR STRIP: 1.01 (ref 1–1.03)
UROBILINOGEN UR STRIP-ACNC: 0.2 EU/DL (ref 0–1)
WBC #/AREA URNS HPF: ABNORMAL /HPF
WBC OTHER # BLD: 6.9 K/UL (ref 4.5–11.5)

## 2024-04-06 PROCEDURE — 6360000002 HC RX W HCPCS: Performed by: NURSE PRACTITIONER

## 2024-04-06 PROCEDURE — 76817 TRANSVAGINAL US OBSTETRIC: CPT

## 2024-04-06 PROCEDURE — 87086 URINE CULTURE/COLONY COUNT: CPT

## 2024-04-06 PROCEDURE — 84702 CHORIONIC GONADOTROPIN TEST: CPT

## 2024-04-06 PROCEDURE — 86870 RBC ANTIBODY IDENTIFICATION: CPT

## 2024-04-06 PROCEDURE — 86850 RBC ANTIBODY SCREEN: CPT

## 2024-04-06 PROCEDURE — 99284 EMERGENCY DEPT VISIT MOD MDM: CPT

## 2024-04-06 PROCEDURE — 85025 COMPLETE CBC W/AUTO DIFF WBC: CPT

## 2024-04-06 PROCEDURE — 86901 BLOOD TYPING SEROLOGIC RH(D): CPT

## 2024-04-06 PROCEDURE — 86900 BLOOD TYPING SEROLOGIC ABO: CPT

## 2024-04-06 PROCEDURE — 86880 COOMBS TEST DIRECT: CPT

## 2024-04-06 PROCEDURE — 81001 URINALYSIS AUTO W/SCOPE: CPT

## 2024-04-06 PROCEDURE — 80053 COMPREHEN METABOLIC PANEL: CPT

## 2024-04-06 RX ADMIN — HUMAN RHO(D) IMMUNE GLOBULIN 300 MCG: 300 INJECTION, SOLUTION INTRAMUSCULAR at 17:47

## 2024-04-06 ASSESSMENT — PAIN - FUNCTIONAL ASSESSMENT: PAIN_FUNCTIONAL_ASSESSMENT: NONE - DENIES PAIN

## 2024-04-06 NOTE — ED PROVIDER NOTES
Independent VALDO Visit.       [facility]  Department of Emergency Medicine   ED  Encounter Note  Admit Date/RoomTime: 2024  1:18 PM  ED Room: 33/    NAME: Farhana Hughes  : 1995  MRN: [id number]     Chief Complaint:  Vaginal Bleeding (8 wks 6 days preg. )    History of Present Illness        Farhana Hughes is a 28 y.o. old female who presents to the emergency department by private vehicle, for known pregnancy with associated abnormal bleeding, which occured 1 day(s) prior to arrival.  Since onset the symptoms have been gradually improving and mild in severity.  Symptoms are associated with lower abdominal cramping.  .  There has been no history of abdominal pain, back pain, chest pain, shortness of breath, fever, chills, sweating, nausea, vomiting, diarrhea, constipation, dark/black stools, blood in stool, cloudy urine, urinary frequency, dysuria, hematuria, urinary urgency, urinary incontinence, vaginal discharge, vaginal itching, or passage of tissue. No LMP recorded.. . GYN Hx: Saw Dr. Duran on  and was diagnosed with a subchorionic hematoma.  Has not received RhoGAM for this pregnancy..  OB History          3    Para   1    Term           1    AB   2    Living   1         SAB   2    IAB        Ectopic        Molar        Multiple        Live Births   1                ROS   Pertinent positives and negatives are stated within HPI, all other systems reviewed and are negative.    Past Medical History:  has a past medical history of Anxiety, BRCA2 gene mutation positive, GERD (gastroesophageal reflux disease), History of blood transfusion, IBS (irritable bowel syndrome), Kidney stone, Psychiatric problem, and Von Willebrand disease (HCC).    Surgical History:  has a past surgical history that includes Tonsillectomy; Kidney surgery (Right); and Parthenon tooth extraction.    Social History:  reports that she has never smoked. She has never used

## 2024-04-07 LAB
COMPONENT: NORMAL
MICROORGANISM SPEC CULT: NO GROWTH
SPECIMEN DESCRIPTION: NORMAL
STATUS OF UNITS: NORMAL
TRANSFUSION STATUS: NORMAL
UNIT DIVISION: 0
UNIT NUMBER: NORMAL

## 2024-04-23 ENCOUNTER — OFFICE VISIT (OUTPATIENT)
Dept: PRIMARY CARE CLINIC | Age: 29
End: 2024-04-23
Payer: COMMERCIAL

## 2024-04-23 VITALS
TEMPERATURE: 97.1 F | SYSTOLIC BLOOD PRESSURE: 100 MMHG | RESPIRATION RATE: 16 BRPM | WEIGHT: 141 LBS | HEART RATE: 87 BPM | OXYGEN SATURATION: 99 % | DIASTOLIC BLOOD PRESSURE: 60 MMHG | BODY MASS INDEX: 25.95 KG/M2 | HEIGHT: 62 IN

## 2024-04-23 DIAGNOSIS — H69.93 DYSFUNCTION OF BOTH EUSTACHIAN TUBES: Primary | ICD-10-CM

## 2024-04-23 PROCEDURE — 99213 OFFICE O/P EST LOW 20 MIN: CPT | Performed by: STUDENT IN AN ORGANIZED HEALTH CARE EDUCATION/TRAINING PROGRAM

## 2024-04-23 PROCEDURE — G2211 COMPLEX E/M VISIT ADD ON: HCPCS | Performed by: STUDENT IN AN ORGANIZED HEALTH CARE EDUCATION/TRAINING PROGRAM

## 2024-04-23 RX ORDER — FLUTICASONE PROPIONATE 50 MCG
1 SPRAY, SUSPENSION (ML) NASAL DAILY
Qty: 32 G | Refills: 1 | Status: SHIPPED | OUTPATIENT
Start: 2024-04-23

## 2024-04-23 ASSESSMENT — PATIENT HEALTH QUESTIONNAIRE - PHQ9
1. LITTLE INTEREST OR PLEASURE IN DOING THINGS: SEVERAL DAYS
SUM OF ALL RESPONSES TO PHQ QUESTIONS 1-9: 2
SUM OF ALL RESPONSES TO PHQ9 QUESTIONS 1 & 2: 2
SUM OF ALL RESPONSES TO PHQ QUESTIONS 1-9: 2
2. FEELING DOWN, DEPRESSED OR HOPELESS: SEVERAL DAYS
SUM OF ALL RESPONSES TO PHQ QUESTIONS 1-9: 2
SUM OF ALL RESPONSES TO PHQ QUESTIONS 1-9: 2

## 2024-04-23 NOTE — PROGRESS NOTES
for this visit.        Rolando Wu, DO       This document may have been prepared at least partially through the use of voice recognition software. Although effort is taken to assure the accuracy ofthis document, it is possible that grammatical, syntax,  or spelling errors may occur.

## 2024-05-10 ENCOUNTER — OFFICE VISIT (OUTPATIENT)
Dept: PRIMARY CARE CLINIC | Age: 29
End: 2024-05-10
Payer: COMMERCIAL

## 2024-05-10 VITALS
WEIGHT: 142 LBS | DIASTOLIC BLOOD PRESSURE: 64 MMHG | SYSTOLIC BLOOD PRESSURE: 90 MMHG | HEART RATE: 89 BPM | OXYGEN SATURATION: 100 % | HEIGHT: 62 IN | BODY MASS INDEX: 26.13 KG/M2 | TEMPERATURE: 97.1 F | RESPIRATION RATE: 17 BRPM

## 2024-05-10 DIAGNOSIS — R00.2 PALPITATIONS: ICD-10-CM

## 2024-05-10 DIAGNOSIS — I45.9 SKIPPED BEATS: Primary | ICD-10-CM

## 2024-05-10 LAB
ALBUMIN: 3.9 G/DL (ref 3.5–5.2)
ALP BLD-CCNC: 52 U/L (ref 35–104)
ALT SERPL-CCNC: 9 U/L (ref 0–32)
ANION GAP SERPL CALCULATED.3IONS-SCNC: 12 MMOL/L (ref 7–16)
AST SERPL-CCNC: 17 U/L (ref 0–31)
BILIRUB SERPL-MCNC: 0.2 MG/DL (ref 0–1.2)
BUN BLDV-MCNC: 8 MG/DL (ref 6–20)
CALCIUM SERPL-MCNC: 9.1 MG/DL (ref 8.6–10.2)
CHLORIDE BLD-SCNC: 104 MMOL/L (ref 98–107)
CO2: 22 MMOL/L (ref 22–29)
CREAT SERPL-MCNC: 0.6 MG/DL (ref 0.5–1)
GFR, ESTIMATED: >90 ML/MIN/1.73M2
GLUCOSE BLD-MCNC: 81 MG/DL (ref 74–99)
HCT VFR BLD CALC: 38.9 % (ref 34–48)
HEMOGLOBIN: 12.3 G/DL (ref 11.5–15.5)
MAGNESIUM: 2 MG/DL (ref 1.6–2.6)
MCH RBC QN AUTO: 27.7 PG (ref 26–35)
MCHC RBC AUTO-ENTMCNC: 31.6 G/DL (ref 32–34.5)
MCV RBC AUTO: 87.6 FL (ref 80–99.9)
PDW BLD-RTO: 14.3 % (ref 11.5–15)
PLATELET # BLD: 254 K/UL (ref 130–450)
PMV BLD AUTO: 10.3 FL (ref 7–12)
POTASSIUM SERPL-SCNC: 4.2 MMOL/L (ref 3.5–5)
RBC # BLD: 4.44 M/UL (ref 3.5–5.5)
SODIUM BLD-SCNC: 138 MMOL/L (ref 132–146)
TOTAL PROTEIN: 6.8 G/DL (ref 6.4–8.3)
TSH SERPL DL<=0.05 MIU/L-ACNC: 0.28 UIU/ML (ref 0.27–4.2)
WBC # BLD: 9.8 K/UL (ref 4.5–11.5)

## 2024-05-10 PROCEDURE — 99214 OFFICE O/P EST MOD 30 MIN: CPT | Performed by: STUDENT IN AN ORGANIZED HEALTH CARE EDUCATION/TRAINING PROGRAM

## 2024-05-10 PROCEDURE — G2211 COMPLEX E/M VISIT ADD ON: HCPCS | Performed by: STUDENT IN AN ORGANIZED HEALTH CARE EDUCATION/TRAINING PROGRAM

## 2024-05-10 PROCEDURE — 93000 ELECTROCARDIOGRAM COMPLETE: CPT | Performed by: STUDENT IN AN ORGANIZED HEALTH CARE EDUCATION/TRAINING PROGRAM

## 2024-05-10 SDOH — ECONOMIC STABILITY: FOOD INSECURITY: WITHIN THE PAST 12 MONTHS, YOU WORRIED THAT YOUR FOOD WOULD RUN OUT BEFORE YOU GOT MONEY TO BUY MORE.: NEVER TRUE

## 2024-05-10 SDOH — ECONOMIC STABILITY: HOUSING INSECURITY
IN THE LAST 12 MONTHS, WAS THERE A TIME WHEN YOU DID NOT HAVE A STEADY PLACE TO SLEEP OR SLEPT IN A SHELTER (INCLUDING NOW)?: NO

## 2024-05-10 SDOH — ECONOMIC STABILITY: FOOD INSECURITY: WITHIN THE PAST 12 MONTHS, THE FOOD YOU BOUGHT JUST DIDN'T LAST AND YOU DIDN'T HAVE MONEY TO GET MORE.: NEVER TRUE

## 2024-05-10 SDOH — ECONOMIC STABILITY: INCOME INSECURITY: HOW HARD IS IT FOR YOU TO PAY FOR THE VERY BASICS LIKE FOOD, HOUSING, MEDICAL CARE, AND HEATING?: NOT HARD AT ALL

## 2024-05-10 NOTE — PROGRESS NOTES
murmur heard.  Pulmonary:      Effort: Pulmonary effort is normal. No respiratory distress.      Breath sounds: Normal breath sounds. No wheezing.   Abdominal:      General: Bowel sounds are normal.      Palpations: Abdomen is soft.   Musculoskeletal:         General: No tenderness. Normal range of motion.   Skin:     General: Skin is warm and dry.   Neurological:      Mental Status: She is alert and oriented to person, place, and time.   Psychiatric:         Behavior: Behavior normal.             Assessment and Plan       1. Skipped beats  Unclear etiology with unclear prognosis  -Could be secondary to pregnancy vs thyroid, vs e lyte vs other, will get labs as below to further evaluate and get holter monitor, EKG in office looked ok, had some left atrial enlargement however will just watch for now and see what holter monitor shows  - Comprehensive Metabolic Panel  - CBC  - Magnesium  - TSH  - EKG 12 Lead - Clinic Performed  - Cardiac holter monitor (1 day-2 day); Future    2. Palpitations  As above  - Cardiac holter monitor (1 day-2 day); Future      I am the primary care provider for this patient and provide the patient with continuity of care.     Counseled regarding above diagnosis, including possible risks and complications,  especially if left uncontrolled. Counseled regarding the possible side effects, risks, benefits and alternatives to treatment;patient and/or guardian verbalizes understanding, agrees, feels comfortable with and wishes to proceed with above treatment plan.    Call or go to EDimmediately if symptoms worsen or persist. Advised patient to call with any new medication issues, and, as applicable, read all Rx info from pharmacy to assure aware of all possible risks and side effects of medicationbefore taking.     Patient and/or guardian given opportunity to ask questions/raise concerns.  The patient verbalized comfort and understanding ofinstructions.    I encourage further reading and education

## 2024-05-16 ENCOUNTER — ANCILLARY PROCEDURE (OUTPATIENT)
Dept: OBGYN CLINIC | Age: 29
End: 2024-05-16
Payer: COMMERCIAL

## 2024-05-16 ENCOUNTER — INITIAL PRENATAL (OUTPATIENT)
Dept: OBGYN CLINIC | Age: 29
End: 2024-05-16
Payer: COMMERCIAL

## 2024-05-16 VITALS
HEART RATE: 94 BPM | WEIGHT: 140 LBS | BODY MASS INDEX: 25.61 KG/M2 | SYSTOLIC BLOOD PRESSURE: 108 MMHG | DIASTOLIC BLOOD PRESSURE: 78 MMHG

## 2024-05-16 DIAGNOSIS — Z3A.14 14 WEEKS GESTATION OF PREGNANCY: Primary | ICD-10-CM

## 2024-05-16 DIAGNOSIS — O36.0920 RHESUS ISOIMMUNIZATION DURING PREGNANCY IN SECOND TRIMESTER, SINGLE OR UNSPECIFIED FETUS: ICD-10-CM

## 2024-05-16 DIAGNOSIS — Z15.89 HETEROZYGOUS MTHFR MUTATION A1298C: ICD-10-CM

## 2024-05-16 DIAGNOSIS — Z34.90 FOURTH PREGNANCY: ICD-10-CM

## 2024-05-16 DIAGNOSIS — Z15.89 PAI-1 4G/5G GENOTYPE: ICD-10-CM

## 2024-05-16 DIAGNOSIS — D68.00 VON WILLEBRAND'S DISEASE (HCC): Chronic | ICD-10-CM

## 2024-05-16 DIAGNOSIS — Z92.89: ICD-10-CM

## 2024-05-16 PROCEDURE — 99204 OFFICE O/P NEW MOD 45 MIN: CPT | Performed by: OBSTETRICS & GYNECOLOGY

## 2024-05-16 PROCEDURE — 99203 OFFICE O/P NEW LOW 30 MIN: CPT | Performed by: OBSTETRICS & GYNECOLOGY

## 2024-05-16 PROCEDURE — 76805 OB US >/= 14 WKS SNGL FETUS: CPT | Performed by: OBSTETRICS & GYNECOLOGY

## 2024-05-16 NOTE — PROGRESS NOTES
Patient is here today for ob new visit. Since last ultrasound about 2 weeks ago no bleeding since. Patient has been having some heart palpations the past month. Saw PCP and they referred her to cardiology has not heard anything yet. Cramping on and off.   
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    Thrombophilia (HCC)     found out in 2024    Von Willebrand disease (HCC)     type 2         SURGICAL:  Past Surgical History:   Procedure Laterality Date    KIDNEY SURGERY Right      - stent     TONSILLECTOMY      and adenoids     WISDOM TOOTH EXTRACTION         ALLERGIES:    No Known Allergies      MEDICATIONS:    Current Outpatient Medications   Medication Sig Dispense Refill    vitamin B-6 (PYRIDOXINE) 25 MG tablet Take 1 tablet by mouth 3 times daily as needed (nausea) 90 tablet 3    fluticasone (FLONASE) 50 MCG/ACT nasal spray 1 spray by Each Nostril route daily 32 g 1    Bnrdnv-SiUviy-SZ-DHA w/o Vit A (PRENA 1 TRUE) 30-1.4 & 300 MG MISC Take by mouth       No current facility-administered medications for this visit.        Social History     Socioeconomic History    Marital status:      Spouse name: None    Number of children: None    Years of education: None    Highest education level: None   Tobacco Use    Smoking status: Never    Smokeless tobacco: Never   Vaping Use    Vaping Use: Never used   Substance and Sexual Activity    Alcohol use: Not Currently     Comment: occ    Drug use: No    Sexual activity: Not Currently     Partners: Male     Birth control/protection: None     Social Determinants of Health     Financial Resource Strain: Low Risk  (5/10/2024)    Overall Financial Resource Strain (CARDIA)     Difficulty of Paying Living Expenses: Not hard at all   Food Insecurity: No Food Insecurity (5/10/2024)    Hunger Vital Sign     Worried About Running Out of Food in the Last Year: Never true     Ran Out of Food in the Last Year: Never true

## 2024-06-04 ENCOUNTER — HOSPITAL ENCOUNTER (OUTPATIENT)
Dept: SLEEP CENTER | Age: 29
Discharge: HOME | End: 2024-06-04
Attending: STUDENT IN AN ORGANIZED HEALTH CARE EDUCATION/TRAINING PROGRAM
Payer: COMMERCIAL

## 2024-06-04 DIAGNOSIS — R00.2 PALPITATIONS: ICD-10-CM

## 2024-06-04 DIAGNOSIS — I45.9 SKIPPED BEATS: ICD-10-CM

## 2024-06-04 PROCEDURE — 93225 XTRNL ECG REC<48 HRS REC: CPT

## 2024-06-07 ENCOUNTER — HOSPITAL ENCOUNTER (OUTPATIENT)
Age: 29
Discharge: HOME | End: 2024-06-07
Payer: COMMERCIAL

## 2024-06-07 DIAGNOSIS — Z3A.14 14 WEEKS GESTATION OF PREGNANCY: ICD-10-CM

## 2024-06-07 DIAGNOSIS — Z34.90 FOURTH PREGNANCY: ICD-10-CM

## 2024-06-07 DIAGNOSIS — D68.00 VON WILLEBRAND'S DISEASE (HCC): Chronic | ICD-10-CM

## 2024-06-07 DIAGNOSIS — Z92.89: ICD-10-CM

## 2024-06-07 DIAGNOSIS — Z15.89 PAI-1 4G/5G GENOTYPE: ICD-10-CM

## 2024-06-07 DIAGNOSIS — O36.0920 RHESUS ISOIMMUNIZATION DURING PREGNANCY IN SECOND TRIMESTER, SINGLE OR UNSPECIFIED FETUS: ICD-10-CM

## 2024-06-07 DIAGNOSIS — Z15.89 HETEROZYGOUS MTHFR MUTATION A1298C: ICD-10-CM

## 2024-06-07 DIAGNOSIS — D68.00 VON WILLEBRAND DISEASE (HCC): ICD-10-CM

## 2024-06-07 PROCEDURE — 85246 CLOT FACTOR VIII VW ANTIGEN: CPT

## 2024-06-07 PROCEDURE — 85245 CLOT FACTOR VIII VW RISTOCTN: CPT

## 2024-06-07 PROCEDURE — 85240 CLOT FACTOR VIII AHG 1 STAGE: CPT

## 2024-06-07 PROCEDURE — 36415 COLL VENOUS BLD VENIPUNCTURE: CPT

## 2024-06-07 PROCEDURE — 86886 COOMBS TEST INDIRECT TITER: CPT

## 2024-06-10 LAB
AB TITER: NEGATIVE
BLD GP AB SCN TITR SERPL: NORMAL {TITER}
COMMENT:: NORMAL
FACTOR VIII ACTIVITY: 87 % (ref 50–150)
VWF AG ACT/NOR PPP IA: 120 % (ref 52–214)
VWF:RCO ACT/NOR PPP PL AGG: 125 % (ref 51–215)

## 2024-06-13 ENCOUNTER — ROUTINE PRENATAL (OUTPATIENT)
Dept: OBGYN CLINIC | Age: 29
End: 2024-06-13
Payer: COMMERCIAL

## 2024-06-13 ENCOUNTER — ANCILLARY PROCEDURE (OUTPATIENT)
Dept: OBGYN CLINIC | Age: 29
End: 2024-06-13
Payer: COMMERCIAL

## 2024-06-13 VITALS
WEIGHT: 144.6 LBS | DIASTOLIC BLOOD PRESSURE: 75 MMHG | SYSTOLIC BLOOD PRESSURE: 108 MMHG | HEART RATE: 88 BPM | BODY MASS INDEX: 26.45 KG/M2

## 2024-06-13 DIAGNOSIS — Z15.89 HETEROZYGOUS MTHFR MUTATION A1298C: ICD-10-CM

## 2024-06-13 DIAGNOSIS — Z15.89 PAI-1 4G/5G GENOTYPE: ICD-10-CM

## 2024-06-13 DIAGNOSIS — Z3A.18 18 WEEKS GESTATION OF PREGNANCY: Primary | ICD-10-CM

## 2024-06-13 DIAGNOSIS — D68.00 VON WILLEBRAND'S DISEASE (HCC): ICD-10-CM

## 2024-06-13 LAB
GLUCOSE URINE, POC: NEGATIVE
PROTEIN UA: NEGATIVE

## 2024-06-13 PROCEDURE — 81002 URINALYSIS NONAUTO W/O SCOPE: CPT | Performed by: OBSTETRICS & GYNECOLOGY

## 2024-06-13 PROCEDURE — 76817 TRANSVAGINAL US OBSTETRIC: CPT | Performed by: OBSTETRICS & GYNECOLOGY

## 2024-06-13 PROCEDURE — 76811 OB US DETAILED SNGL FETUS: CPT | Performed by: OBSTETRICS & GYNECOLOGY

## 2024-06-13 PROCEDURE — 99213 OFFICE O/P EST LOW 20 MIN: CPT | Performed by: OBSTETRICS & GYNECOLOGY

## 2024-06-13 NOTE — PROGRESS NOTES
[facility] [place] [facility]  [address] [facility] [address]  Dept: [phone]  Loc: [phone]     2024    RE:  Farhana Hughes     : 1995   AGE: 28 y.o.     Dear Dr. Alberts,    Thank you for allowing me to see Farhana Hughes.  As I'm sure you will recall, Farhana Hughes is a 28 y.o. [id number]'s last menstrual period was 2023 (exact date). 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 2024    18 weeks gestation of pregnancy 2024    Fourth pregnancy 2024    Hx of transfusion 2024    Rh negative state in antepartum period 2021    Von Willebrand's disease (HCC) 2021    History of kidney stones 2021    BRCA gene positive 2021    Anxiety 2021        PAST HISTORY:  OB History    Para Term  AB Living   4 1   1 2 1   SAB IAB Ectopic Molar Multiple Live Births   2         1      # Outcome Date GA Lbr Kilo/2nd Weight Sex Delivery Anes PTL Lv   4 Current            3 SAB 24 5w0d          2  22 35w6d  2.665 kg (5 lb 14 oz) M Vag-Spont EPI Y LORI      Complications:  Labor   1 SAB 2021 6w0d       ND          MEDICAL:  Past Medical History:   Diagnosis Date    Anxiety 2021    BRCA2 gene mutation positive 2020    Breast disorder 10/2022    Results were benign after a mass was found. Follow up every 6 months for BRCA2 gene with high risk breast specialist    GERD (gastroesophageal reflux disease)     History of blood transfusion     tranfused x 6 for von willebrands    IBS (irritable bowel syndrome)

## 2024-06-13 NOTE — PATIENT INSTRUCTIONS
Patient Education        Weeks 18 to 22 of Your Pregnancy: Care Instructions  At this stage you may find that your nausea and fatigue are gone. You may feel better overall and have more energy. But you might now also have some new discomforts, like sleep problems or leg cramps.    You may start to feel your baby move. These movements can feel like butterflies or bubbles.   Babies at this stage can now suck their thumbs.     Get some exercise every day.  And avoid caffeine late in the day.     Take a warm shower or bath before bed.  Try relaxation exercises to calm your mind and body.     Use extra pillows.  They can help you get comfortable.     Don't use sleeping pills or alcohol.  They could harm your baby.     For leg cramps, stretch and apply heat.  A warm bath, leg warmers, a heating pad, or a hot water bottle can help with muscle aches.   Stretches for leg cramps    Straighten your leg and bend your foot (flex your ankle) slowly upward, toward your knee. Bend your toes up and down.   Stand on a flat surface. Stretch your toes upward. For balance, hold on to the wall or something stable. If it feels okay, take small steps walking on your heels.   Follow-up care is a key part of your treatment and safety. Be sure to make and go to all appointments, and call your doctor if you are having problems. It's also a good idea to know your test results and keep a list of the medicines you take.  Where can you learn more?  Go to https://[email or web address] and enter W603 to learn more about \"Weeks 18 to 22 of Your Pregnancy: Care Instructions.\"  Current as of: July 10, 2023  Content Version: 14.1  © 1396-4649 [employer].   Care instructions adapted under license by [employer]. If you have questions about a medical condition or this instruction, always ask your healthcare professional. [employer] disclaims any warranty or liability for your use of this information.       Patient

## 2024-06-13 NOTE — PROGRESS NOTES
Farhana Hughes presents to office today for US.  Patient denies bleeding, LOF, or cramping.   Positive fetal movement.   States she had some cramping last night, but has resolved.

## 2024-06-20 ENCOUNTER — OFFICE VISIT (OUTPATIENT)
Dept: ONCOLOGY | Age: 29
End: 2024-06-20
Payer: COMMERCIAL

## 2024-06-20 ENCOUNTER — HOSPITAL ENCOUNTER (OUTPATIENT)
Dept: INFUSION THERAPY | Age: 29
Discharge: HOME | End: 2024-06-20

## 2024-06-20 VITALS
HEART RATE: 88 BPM | OXYGEN SATURATION: 100 % | SYSTOLIC BLOOD PRESSURE: 109 MMHG | BODY MASS INDEX: 26.87 KG/M2 | DIASTOLIC BLOOD PRESSURE: 80 MMHG | TEMPERATURE: 98 F | HEIGHT: 62 IN | WEIGHT: 146 LBS

## 2024-06-20 DIAGNOSIS — D68.00 VON WILLEBRAND DISEASE (HCC): Primary | ICD-10-CM

## 2024-06-20 PROCEDURE — 99213 OFFICE O/P EST LOW 20 MIN: CPT | Performed by: CLINICAL NURSE SPECIALIST

## 2024-06-20 PROCEDURE — 99212 OFFICE O/P EST SF 10 MIN: CPT

## 2024-06-20 NOTE — PROGRESS NOTES
2N. Baseline studies as of 9/27/23:  F VIII activity 37%  vWF Ag 48%  vWF activity 30   Will re-check levels at 28wks and 34wks of gestation.     Today 6/20/2024 I am seeing patient for the first time today , she had been followed by Dr. Ch 19 weeks today labs reviewed   6/7/2024  :vWF Ag120  Factor 8 [id number]  factorVlll activity 87  Est ojbsqada47/10/24   28 weeks will be aug 18 , 34 weeks will be sept 30 , and at 39 weeks she will be  induced   Lab orders placed       Casandra PINEDA, ACNS-BC,AGACNP-BC   [facility] [place] [facility] MEDICAL ONCOLOGY  [address] [place] [address]  Dept: [phone]  Loc: [phone]

## 2024-06-25 ENCOUNTER — HOSPITAL ENCOUNTER (OUTPATIENT)
Age: 29
Setting detail: OBSERVATION
Discharge: HOME | End: 2024-06-26
Attending: OBSTETRICS & GYNECOLOGY | Admitting: OBSTETRICS & GYNECOLOGY
Payer: COMMERCIAL

## 2024-06-25 PROBLEM — N93.9 VAGINAL BLEEDING: Status: ACTIVE | Noted: 2024-06-25

## 2024-06-25 LAB
% FETAL BLEED: 0 %
BACTERIA URNS QL MICRO: ABNORMAL
BILIRUB UR QL STRIP: NEGATIVE
CLARITY UR: CLEAR
COLOR UR: YELLOW
EPI CELLS #/AREA URNS HPF: ABNORMAL /HPF
FETAL BLEED VOLUME: 0 ML
GLUCOSE UR STRIP-MCNC: NEGATIVE MG/DL
HGB UR QL STRIP.AUTO: NEGATIVE
KETONES UR STRIP-MCNC: NEGATIVE MG/DL
LEUKOCYTE ESTERASE UR QL STRIP: ABNORMAL
NITRITE UR QL STRIP: NEGATIVE
PH UR STRIP: 6 [PH] (ref 5–9)
PROT UR STRIP-MCNC: NEGATIVE MG/DL
RBC #/AREA URNS HPF: ABNORMAL /HPF
RHOGAM DOSES REQUIRED: 1
SP GR UR STRIP: 1.01 (ref 1–1.03)
UROBILINOGEN UR STRIP-ACNC: 0.2 EU/DL (ref 0–1)
WBC #/AREA URNS HPF: ABNORMAL /HPF

## 2024-06-25 PROCEDURE — 86850 RBC ANTIBODY SCREEN: CPT

## 2024-06-25 PROCEDURE — 86870 RBC ANTIBODY IDENTIFICATION: CPT

## 2024-06-25 PROCEDURE — 86920 COMPATIBILITY TEST SPIN: CPT

## 2024-06-25 PROCEDURE — 87086 URINE CULTURE/COLONY COUNT: CPT

## 2024-06-25 PROCEDURE — 96372 THER/PROPH/DIAG INJ SC/IM: CPT

## 2024-06-25 PROCEDURE — G0378 HOSPITAL OBSERVATION PER HR: HCPCS

## 2024-06-25 PROCEDURE — 85460 HEMOGLOBIN FETAL: CPT

## 2024-06-25 PROCEDURE — 86922 COMPATIBILITY TEST ANTIGLOB: CPT

## 2024-06-25 PROCEDURE — 86880 COOMBS TEST DIRECT: CPT

## 2024-06-25 PROCEDURE — 81001 URINALYSIS AUTO W/SCOPE: CPT

## 2024-06-25 PROCEDURE — 86900 BLOOD TYPING SEROLOGIC ABO: CPT

## 2024-06-25 PROCEDURE — 6360000002 HC RX W HCPCS: Performed by: NURSE PRACTITIONER

## 2024-06-25 PROCEDURE — 86901 BLOOD TYPING SEROLOGIC RH(D): CPT

## 2024-06-25 RX ORDER — FOLIC ACID 1 MG/1
1 TABLET ORAL DAILY
COMMUNITY

## 2024-06-25 RX ADMIN — HUMAN RHO(D) IMMUNE GLOBULIN 300 MCG: 1500 SOLUTION INTRAMUSCULAR; INTRAVENOUS at 23:38

## 2024-06-26 ENCOUNTER — ANCILLARY PROCEDURE (OUTPATIENT)
Dept: OBGYN CLINIC | Age: 29
End: 2024-06-26
Payer: COMMERCIAL

## 2024-06-26 VITALS
RESPIRATION RATE: 16 BRPM | DIASTOLIC BLOOD PRESSURE: 56 MMHG | HEART RATE: 82 BPM | TEMPERATURE: 98.8 F | SYSTOLIC BLOOD PRESSURE: 96 MMHG

## 2024-06-26 PROBLEM — D68.59 THROMBOPHILIA (HCC): Status: ACTIVE | Noted: 2024-06-26

## 2024-06-26 PROBLEM — D68.00 VON WILLEBRAND DISEASE (HCC): Status: ACTIVE | Noted: 2024-06-26

## 2024-06-26 PROBLEM — Z3A.20 20 WEEKS GESTATION OF PREGNANCY: Status: ACTIVE | Noted: 2024-06-26

## 2024-06-26 PROBLEM — O46.8X2 SUBCHORIONIC HEMATOMA IN SECOND TRIMESTER: Status: ACTIVE | Noted: 2024-06-26

## 2024-06-26 PROBLEM — O09.892 HX OF PRETERM DELIVERY, CURRENTLY PREGNANT, SECOND TRIMESTER: Status: ACTIVE | Noted: 2024-06-26

## 2024-06-26 PROBLEM — O41.8X20 SUBCHORIONIC HEMATOMA IN SECOND TRIMESTER: Status: ACTIVE | Noted: 2024-06-26

## 2024-06-26 LAB
BASOPHILS # BLD: 0.03 K/UL (ref 0–0.2)
BASOPHILS NFR BLD: 0 % (ref 0–2)
COMPONENT: NORMAL
EOSINOPHIL # BLD: 0.18 K/UL (ref 0.05–0.5)
EOSINOPHILS RELATIVE PERCENT: 2 % (ref 0–6)
ERYTHROCYTE [DISTWIDTH] IN BLOOD BY AUTOMATED COUNT: 14.7 % (ref 11.5–15)
HCT VFR BLD AUTO: 34.6 % (ref 34–48)
HGB BLD-MCNC: 11.1 G/DL (ref 11.5–15.5)
IMM GRANULOCYTES # BLD AUTO: 0.07 K/UL (ref 0–0.58)
IMM GRANULOCYTES NFR BLD: 1 % (ref 0–5)
LYMPHOCYTES NFR BLD: 2.84 K/UL (ref 1.5–4)
LYMPHOCYTES RELATIVE PERCENT: 26 % (ref 20–42)
MCH RBC QN AUTO: 28.6 PG (ref 26–35)
MCHC RBC AUTO-ENTMCNC: 32.1 G/DL (ref 32–34.5)
MCV RBC AUTO: 89.2 FL (ref 80–99.9)
MONOCYTES NFR BLD: 0.72 K/UL (ref 0.1–0.95)
MONOCYTES NFR BLD: 7 % (ref 2–12)
NEUTROPHILS NFR BLD: 64 % (ref 43–80)
NEUTS SEG NFR BLD: 6.95 K/UL (ref 1.8–7.3)
PLATELET # BLD AUTO: 234 K/UL (ref 130–450)
PMV BLD AUTO: 10.1 FL (ref 7–12)
RBC # BLD AUTO: 3.88 M/UL (ref 3.5–5.5)
STATUS OF UNITS: NORMAL
TRANSFUSION STATUS: NORMAL
UNIT DIVISION: 0
UNIT NUMBER: NORMAL
WBC OTHER # BLD: 10.8 K/UL (ref 4.5–11.5)

## 2024-06-26 PROCEDURE — 76817 TRANSVAGINAL US OBSTETRIC: CPT | Performed by: OBSTETRICS & GYNECOLOGY

## 2024-06-26 PROCEDURE — 85025 COMPLETE CBC W/AUTO DIFF WBC: CPT

## 2024-06-26 PROCEDURE — 76815 OB US LIMITED FETUS(S): CPT | Performed by: OBSTETRICS & GYNECOLOGY

## 2024-06-26 PROCEDURE — G0378 HOSPITAL OBSERVATION PER HR: HCPCS

## 2024-06-26 PROCEDURE — 99221 1ST HOSP IP/OBS SF/LOW 40: CPT | Performed by: OBSTETRICS & GYNECOLOGY

## 2024-06-26 NOTE — PROGRESS NOTES
Patient resting comfortably in bed. Denies any more vb and states that the cramping has subsided a bit. Denies lof. + FM. Menu given for patient to order breakfast. Call light within reach

## 2024-06-26 NOTE — PROGRESS NOTES
L&D DISCHARGE NOTE:    Patient:  Farhana Hughes     Admit Date:  6/25/2024  8:03 PM  Medical Record Number:  [id number]   Today's Date: 6/26/2024    S: Cramping has subsided a bit - no further bleeding. Has bno sx UTI or PIH. FM present    O:   Vitals:    06/25/24 2025 06/26/24 0758   BP: 103/68 (!) 96/56   Pulse: (!) 102 82   Resp:  16   Temp:  98.8 °F (37.1 °C)   TempSrc:  Oral       FHT: 152 spot check    Gen: A&O, cooperative, pleasant   Heart: RRR   Lungs: CTA b/l   Abd; soft, NT, non distended, +BS  Back: no CVA or paraspinal tenderness   Ext: No clubbing, cyanosis, edema:no , no cords palpable, no calf tenderness   Neuro: intact   Inc: clean, dry, intact  Uterus: soft/gravid/NT, FH=D  Lynnette pad: no bleeding  Cervix: not reexamined  Membranes: Intact.    Recent Results (from the past 72 hour(s))   Urinalysis with Microscopic    Collection Time: 06/25/24  8:30 PM   Result Value Ref Range    Color, UA Yellow Yellow    Turbidity UA Clear Clear    Glucose, Ur NEGATIVE NEGATIVE mg/dL    Bilirubin, Urine NEGATIVE NEGATIVE    Ketones, Urine NEGATIVE NEGATIVE mg/dL    Specific Gravity, UA 1.015 1.005 - 1.030    Urine Hgb NEGATIVE NEGATIVE    pH, Urine 6.0 5.0 - 9.0    Protein, UA NEGATIVE NEGATIVE mg/dL    Urobilinogen, Urine 0.2 0.0 - 1.0 EU/dL    Nitrite, Urine NEGATIVE NEGATIVE    Leukocyte Esterase, Urine TRACE (A) NEGATIVE    WBC, UA 0 TO 5 0 TO 5 /HPF    RBC, UA 0 TO 2 0 TO 2 /HPF    Epithelial Cells, UA 0 TO 2 /HPF    Bacteria, UA TRACE (A) None   RHOGAM LAB NOTIFICATION    Collection Time: 06/25/24  9:30 PM   Result Value Ref Range    Unit Number [id number]     Component RHIG     Unit Divison 00     Status of Units TRANSFUSED     Transfusion Status OK TO TRANSFUSE    TYPE AND SCREEN    Collection Time: 06/25/24  9:45 PM   Result Value Ref Range    Blood Bank Sample Expiration 06/28/2024,2359     Arm Band Number [id number]     ABO/Rh O NEGATIVE     Antibody Screen POSITIVE     Antibody ID Anti-D, Passive Due To

## 2024-06-26 NOTE — CONSULTS
MATERNAL FETAL MEDICINE CONSULT    NAME: Farhana Hughes  MRN: [id number]            SERVICE DATE: 2024  SERVICE TIME: 9:42 AM  REQUESTING PROVIDER: Berto Alberts MD         I was asked by Berto James MD to provide an inpatient consult for Farhana Hughes. As I am sure you will recall, Farhana Hughes is a 29 y.o. female,  at 20w3d with an DANIEL of 11/10/2024, by Ultrasound dating method.  Patient is here with the following problems:  Principal Problem:    Vaginal bleeding  Resolved Problems:    * No resolved hospital problems. *      SUBJECTIVE:  HISTORY OF THE PRESENT ILLNESS:  HISTORY REVIEW  Past Medical History:   Diagnosis Date    Anxiety 2021    BRCA2 gene mutation positive 2020    Breast disorder 10/2022    Results were benign after a mass was found. 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

## 2024-06-26 NOTE — DISCHARGE INSTRUCTIONS
Home Undelivered Discharge Instructions    After Discharge Orders:    Future Appointments   Date Time Provider Department Center   7/12/2024  9:15 AM SCHEDULE, [facility] RM 2 [facility]   7/23/2024 12:45 PM Jody Love APRN - CNP [facility]   8/23/2024 10:00 AM Casandra Abdul APRN [facility]   8/23/2024 10:15 AM [facility] FAST TRACK 2 [facility]   10/4/2024 10:00 AM Casandra Abdul APRN [facility]   10/4/2024 10:15 AM [facility] CHAIR 5 [facility] Med On[facility]       Diet:  normal diet as tolerated    Rest: normal activity as tolerated    Other instructions: Do kick counts once a day on your baby. Choose the time of day your baby is most active. Get in a comfortable lying or sitting position and time how long it takes to feel 10 kicks, twists, turns, swishes, or rolls. Call your physician or midwife if there have not been 10 kicks in 2 hours    Call physician or midwife, return to Labor and Delivery, call 911, or go to the nearest Emergency Room if: increased leakage or fluid, contractions more than  6 per  1 hour, decreased fetal movement, persistent low back pain or cramping, bleeding from vaginal area, difficulty urinating, pain with urination, difficulty breathing, new calf pain, persistent headache, or vision change

## 2024-06-26 NOTE — H&P
Physically Abused: No     Sexually Abused: No   Housing Stability: Unknown (5/10/2024)    Housing Stability Vital Sign     Unable to Pay for Housing in the Last Year: Not on file     Number of Places Lived in the Last Year: Not on file     Unstable Housing in the Last Year: No       Family History:       Problem Relation Age of Onset    Bleeding Prob Mother     Heart Disease Father     Prostate Cancer Father     Cancer Father 60        prostate    Breast Cancer Sister 50        pos BRCA 2     Other Sister 48        +brca 2    Other Sister         Positive BRCA 2     Labor Sister     Cancer Maternal Grandfather         stomach    Pancreatic Cancer Maternal Aunt      Labor Sister        Medications Prior to Admission:  Medications Prior to Admission: folic acid (FOLVITE) 1 MG tablet, Take 1 tablet by mouth daily  vitamin B-6 (PYRIDOXINE) 25 MG tablet, Take 1 tablet by mouth 3 times daily as needed (nausea)  fluticasone (FLONASE) 50 MCG/ACT nasal spray, 1 spray by Each Nostril route daily  Ovxdxa-WzSuco-IG-DHA w/o Vit A (PRENA 1 TRUE) 30-1.4 & 300 MG MISC, Take by mouth    REVIEW OF SYSTEMS:  CONSTITUTIONAL:  negative  RESPIRATORY:  negative  CARDIOVASCULAR:  negative  GASTROINTESTINAL:  negative  ALLERGIC/IMMUNOLOGIC:  negative  NEUROLOGICAL:  negative  BEHAVIOR/PSYCH:  negative    PHYSICAL EXAM:  Vitals:    24   BP: 103/68   Pulse: (!) 102     General appearance:  awake, alert, cooperative, no apparent distress, and appears stated age  Skin: warm, dry, normal color, no rash  Heart:  Regular rate & rhythm, S1 S2, no murmurs, rubs, or gallops  Lungs:  No increased work of breathing, good air exchange, CTA b/l.  Abdomen:  Soft, non tender, gravid, consistent with her gestational age   Extremities: No clubbing, cyanosis, cords; No calf tenderness; Edema: trace  Fetal heart rate:  Reassuring.  Pelvis:  Adequate pelvis  Cervix: Closed / per spec  SSE-no active bleeding  Contraction frequency:  0

## 2024-06-26 NOTE — PROGRESS NOTES
Patient given verbal and written discharge instructions with standard labor precautions instructed to keep follow up appointment with physician. Patient verbalizes understanding states no questions or concerns. Patient ambulatory off unit with family member.

## 2024-06-26 NOTE — PROGRESS NOTES
Patient presents to L&D with complaints of spotting and cramping. Patient denies LOF, VB. Reports +FM  Doppler FHT at 150.

## 2024-06-26 NOTE — PROGRESS NOTES
Dr Alberts updated KB-, RhoGAM given. Patient reports feeling more uterine cramping. Denies an increase in VB.  Reports +FM.  Orders for CBC, patient to see MFM in AM.

## 2024-06-27 LAB
MICROORGANISM SPEC CULT: ABNORMAL
SERVICE CMNT-IMP: ABNORMAL
SPECIMEN DESCRIPTION: ABNORMAL

## 2024-06-28 LAB
ABO/RH: NORMAL
ANTIBODY IDENTIFICATION: NORMAL
ANTIBODY IDENTIFICATION: NORMAL
ANTIBODY SCREEN: POSITIVE
ARM BAND NUMBER: NORMAL
BLOOD BANK COMMENT: NORMAL
BLOOD BANK COMMENT: NORMAL
BLOOD BANK DISPENSE STATUS: NORMAL
BLOOD BANK SAMPLE EXPIRATION: NORMAL
BPU ID: NORMAL
COMPONENT: NORMAL
CROSSMATCH RESULT: NORMAL
DAT, POLYSPECIFIC: NEGATIVE
TRANSFUSION STATUS: NORMAL
UNIT DIVISION: 0

## 2024-07-18 ENCOUNTER — ROUTINE PRENATAL (OUTPATIENT)
Dept: OBGYN CLINIC | Age: 29
End: 2024-07-18
Payer: COMMERCIAL

## 2024-07-18 ENCOUNTER — ANCILLARY PROCEDURE (OUTPATIENT)
Dept: OBGYN CLINIC | Age: 29
End: 2024-07-18
Payer: COMMERCIAL

## 2024-07-18 VITALS
WEIGHT: 149.6 LBS | HEART RATE: 83 BPM | DIASTOLIC BLOOD PRESSURE: 72 MMHG | SYSTOLIC BLOOD PRESSURE: 110 MMHG | BODY MASS INDEX: 27.36 KG/M2

## 2024-07-18 DIAGNOSIS — Z3A.23 23 WEEKS GESTATION OF PREGNANCY: Primary | ICD-10-CM

## 2024-07-18 PROBLEM — N93.9 VAGINAL BLEEDING: Status: RESOLVED | Noted: 2024-06-25 | Resolved: 2024-07-18

## 2024-07-18 PROBLEM — Z3A.18 18 WEEKS GESTATION OF PREGNANCY: Status: RESOLVED | Noted: 2024-05-16 | Resolved: 2024-07-18

## 2024-07-18 LAB
GLUCOSE URINE, POC: NEGATIVE
PROTEIN UA: NEGATIVE

## 2024-07-18 PROCEDURE — 76816 OB US FOLLOW-UP PER FETUS: CPT | Performed by: OBSTETRICS & GYNECOLOGY

## 2024-07-18 PROCEDURE — 99213 OFFICE O/P EST LOW 20 MIN: CPT | Performed by: OBSTETRICS & GYNECOLOGY

## 2024-07-18 PROCEDURE — 81002 URINALYSIS NONAUTO W/O SCOPE: CPT | Performed by: OBSTETRICS & GYNECOLOGY

## 2024-07-18 PROCEDURE — 99999 PR OFFICE/OUTPT VISIT,PROCEDURE ONLY: CPT | Performed by: OBSTETRICS & GYNECOLOGY

## 2024-07-18 NOTE — PROGRESS NOTES
Farhana Hughes presents to office today for US.  Patient denies bleeding, LOF, or cramping.   Positive fetal movement.

## 2024-07-18 NOTE — PROGRESS NOTES
[facility] SPECIALTY CARE [facility] MATERNAL FETAL MEDICINE  [address] [address]  Dept: [phone]  Loc: [phone]     2024    RE:  Farhana Hughes     : 1995   AGE: 29 y.o.     Dear Dr. Alberts,    Thank you for allowing me to see Farhana Hughes.  As I'm sure you will recall, Farhana Hughes is a 29 y.o. [id number]'s last menstrual period was 2023 (exact date). 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 2024    Fourth pregnancy 2024    Hx of transfusion 2024    Rh negative state in antepartum period 2021    Von Willebrand's disease (HCC) 2021    History of kidney stones 2021    BRCA gene positive 2021    Anxiety 2021        PAST HISTORY:  OB History    Para Term  AB Living   4 1   1 2 1   SAB IAB Ectopic Molar Multiple Live Births   2         1      # Outcome Date GA Lbr Kilo/2nd Weight Sex Delivery Anes PTL Lv   4 Current            3 SAB 24 5w0d          2  22 35w6d  2.665 kg (5 lb 14 oz) M Vag-Spont EPI Y LORI      Complications:  Labor   1 SAB 2021 6w0d       ND          MEDICAL:  Past Medical History:   Diagnosis Date    Anxiety 2021    BRCA2 gene mutation positive 2020    Breast disorder 10/2022    Results were benign after a mass was found. Follow up

## 2024-07-18 NOTE — PATIENT INSTRUCTIONS
N531 to learn more about \"Learning About When to Call Your Doctor During Pregnancy (After 20 Weeks).\"  Current as of: July 10, 2023  Content Version: 14.1  © 2006-2024 [employer].   Care instructions adapted under license by [employer]. If you have questions about a medical condition or this instruction, always ask your healthcare professional. [employer] disclaims any warranty or liability for your use of this information.

## 2024-08-12 ENCOUNTER — ANCILLARY PROCEDURE (OUTPATIENT)
Dept: OBGYN CLINIC | Age: 29
End: 2024-08-12
Payer: COMMERCIAL

## 2024-08-12 ENCOUNTER — ROUTINE PRENATAL (OUTPATIENT)
Dept: OBGYN CLINIC | Age: 29
End: 2024-08-12
Payer: COMMERCIAL

## 2024-08-12 VITALS
DIASTOLIC BLOOD PRESSURE: 72 MMHG | WEIGHT: 157.2 LBS | SYSTOLIC BLOOD PRESSURE: 108 MMHG | BODY MASS INDEX: 28.75 KG/M2 | HEART RATE: 110 BPM

## 2024-08-12 DIAGNOSIS — Z3A.27 27 WEEKS GESTATION OF PREGNANCY: Primary | ICD-10-CM

## 2024-08-12 DIAGNOSIS — Z15.89 PAI-1 4G/5G GENOTYPE: ICD-10-CM

## 2024-08-12 DIAGNOSIS — Z15.89 HETEROZYGOUS MTHFR MUTATION A1298C: ICD-10-CM

## 2024-08-12 DIAGNOSIS — D68.00 VON WILLEBRAND'S DISEASE (HCC): ICD-10-CM

## 2024-08-12 LAB
GLUCOSE URINE, POC: NEGATIVE
PROTEIN UA: NEGATIVE

## 2024-08-12 PROCEDURE — 76816 OB US FOLLOW-UP PER FETUS: CPT | Performed by: OBSTETRICS & GYNECOLOGY

## 2024-08-12 PROCEDURE — 99999 PR OFFICE/OUTPT VISIT,PROCEDURE ONLY: CPT | Performed by: OBSTETRICS & GYNECOLOGY

## 2024-08-12 PROCEDURE — 81002 URINALYSIS NONAUTO W/O SCOPE: CPT | Performed by: OBSTETRICS & GYNECOLOGY

## 2024-08-12 PROCEDURE — 99213 OFFICE O/P EST LOW 20 MIN: CPT | Performed by: OBSTETRICS & GYNECOLOGY

## 2024-08-12 NOTE — PROGRESS NOTES
Farhana Hughes presents to office today for US.  Patient denies bleeding, LOF, or contractions.  Positive fetal movement.     
information.    Discussion:  There is a galarza fetus in a breech presentation.  Fetal cardiac motion and fetal motion was detected and appears to be grossly normal.  There is been appropriate interval growth.  The baby is AGA at the 57th percentile.  1083 g 2 pounds 6 ounces.  No anomalies are noted.  The placenta is anterior.  The amniotic fluid volume is normal.    IMPRESSION:  1. Single intrauterine gestation at 27+ weeks with multiple medical conditions.  2.  No obvious fetal anomalies are noted.  The fetus is in a breech presentation.  3.  The amniotic fluid volume is normal and the placenta is anterior.    RECOMMENDATIONS:  Each of the recommendations were discussed with the patient:  1.  Continue serial growth ultrasounds every 4 weeks.  2.  Delivery at 39 weeks gestation.  3.  Follow-up would be otherwise as clinically indicated.    The patient is to continue to follow with you in your office for ongoing obstetric care.     PLAN:    As noted above or sooner prn.    Sincerely,        Garry Gardner MD

## 2024-08-12 NOTE — PATIENT INSTRUCTIONS
signs at 37 weeks or more  If you have signs of labor at 37 weeks or more, your doctor may tell you to call when your labor becomes more active. Symptoms of active labor include:  Contractions that are regular.  Contractions that are less than 5 minutes apart.  Contractions that are hard to talk through.  Follow-up care is a key part of your treatment and safety. Be sure to make and go to all appointments, and call your doctor if you are having problems. It's also a good idea to know your test results and keep a list of the medicines you take.  Where can you learn more?  Go to https://www.[employer].net/patientEd and enter N531 to learn more about \"Learning About When to Call Your Doctor During Pregnancy (After 20 Weeks).\"  Current as of: July 10, 2023  Content Version: 14.1  © 2006-2024 [employer].   Care instructions adapted under license by [employer]. If you have questions about a medical condition or this instruction, always ask your healthcare professional. [employer], [employer] disclaims any warranty or liability for your use of this information.

## 2024-08-17 ENCOUNTER — HOSPITAL ENCOUNTER (OUTPATIENT)
Age: 29
Discharge: HOME | End: 2024-08-17
Payer: COMMERCIAL

## 2024-08-17 DIAGNOSIS — D68.00 VON WILLEBRAND DISEASE (HCC): ICD-10-CM

## 2024-08-17 PROCEDURE — 85246 CLOT FACTOR VIII VW ANTIGEN: CPT

## 2024-08-17 PROCEDURE — 85245 CLOT FACTOR VIII VW RISTOCTN: CPT

## 2024-08-17 PROCEDURE — 36415 COLL VENOUS BLD VENIPUNCTURE: CPT

## 2024-08-17 PROCEDURE — 85240 CLOT FACTOR VIII AHG 1 STAGE: CPT

## 2024-08-20 LAB — FACTOR VIII ACTIVITY: 17 % (ref 50–150)

## 2024-08-21 ENCOUNTER — HOSPITAL ENCOUNTER (OUTPATIENT)
Age: 29
Discharge: HOME | End: 2024-08-21
Payer: COMMERCIAL

## 2024-08-21 DIAGNOSIS — D68.00 VON WILLEBRAND DISEASE (HCC): Primary | ICD-10-CM

## 2024-08-21 DIAGNOSIS — D68.00 VON WILLEBRAND DISEASE (HCC): ICD-10-CM

## 2024-08-21 LAB
PARTIAL THROMBOPLASTIN TIME: 31.3 SEC (ref 24.5–35.1)
VWF AG ACT/NOR PPP IA: 114 % (ref 52–214)
VWF:RCO ACT/NOR PPP PL AGG: 124 % (ref 51–215)

## 2024-08-21 PROCEDURE — 85240 CLOT FACTOR VIII AHG 1 STAGE: CPT

## 2024-08-21 PROCEDURE — 36415 COLL VENOUS BLD VENIPUNCTURE: CPT

## 2024-08-21 PROCEDURE — 85730 THROMBOPLASTIN TIME PARTIAL: CPT

## 2024-08-23 ENCOUNTER — HOSPITAL ENCOUNTER (OUTPATIENT)
Dept: INFUSION THERAPY | Age: 29
Discharge: HOME | End: 2024-08-23

## 2024-08-23 ENCOUNTER — OFFICE VISIT (OUTPATIENT)
Dept: ONCOLOGY | Age: 29
End: 2024-08-23
Payer: COMMERCIAL

## 2024-08-23 VITALS
DIASTOLIC BLOOD PRESSURE: 63 MMHG | HEIGHT: 62 IN | WEIGHT: 158.6 LBS | OXYGEN SATURATION: 98 % | TEMPERATURE: 98.1 F | SYSTOLIC BLOOD PRESSURE: 111 MMHG | BODY MASS INDEX: 29.19 KG/M2 | HEART RATE: 97 BPM

## 2024-08-23 DIAGNOSIS — D68.00 VON WILLEBRAND'S DISEASE (HCC): Primary | Chronic | ICD-10-CM

## 2024-08-23 LAB — FACTOR VIII ACTIVITY: 89 % (ref 50–150)

## 2024-08-23 PROCEDURE — 99214 OFFICE O/P EST MOD 30 MIN: CPT | Performed by: INTERNAL MEDICINE

## 2024-08-23 NOTE — PROGRESS NOTES
and titer and if any bleeding to go to the ER as she might need humate P ( von willebrand and factor VIII replacement then)     - Discussed with her that the goal for delivery and procedures is factor VIII functional activity and von willebrand above 50%.   ## for her planned delivery or if if she has an early labor: it is reasonable to proceed with same measures she had prior with her previous pregnancy, which is giving DDAVP prior to epidural:  0.3 mcg/kg once; do not exceed 20 mcg/dose for prevention of surgical bleeding, administer 30 to 60 minutes before procedure with (Fluid restriction to 40oz in 24 hour following each dose to avoid hyponatremia then) and  PRN Tranexamic Acid (Lysteda) 1300 mg PO TID can be considered for additional bleeding control if required then as well and Hematology to be consulted inpatient.   -recheck levels at 34wks of gestation.                   ## JHON-1 Genotype 4G/5G : discussed previously with Dr. Workman as detailed below :   \"\" in the setting of known vWD type 2N and early non consecutive miscarriages x 2. Due to increased risk of bleeding carried w/ her vWD diagnosis I would hold off on initiating ASA until the case is discussed with the physician she will see at the high risk OB clinic in April and her risk of preeclampsia is assessed. There is a dispute of the correlation between JHON-1 polymorphism and preeclampsia. Gerhardt et al. (2005) discovered that women with JHON-1 5G/5G genotype are at risk for early onset of severe preeclampsia (17-35 gestational weeks). Kaushal LANZA et al. (2013) found that JHON-1 4G/5G polymorphism is not associated with preeclampsia with a total of 5003 women involved in the meta-analysis.  The role of JHON-1(4G/5G) polymorphisms in recurrent pregnancy loss is also controversial. Of note recurrent pregnancy loss is defined as the occurrence of consecutive pregnancy losses before the 20th week of gestation with the same partner.   In discussion with

## 2024-09-04 ENCOUNTER — TELEPHONE (OUTPATIENT)
Dept: INFUSION THERAPY | Age: 29
End: 2024-09-04

## 2024-09-12 ENCOUNTER — ANCILLARY PROCEDURE (OUTPATIENT)
Dept: OBGYN CLINIC | Age: 29
End: 2024-09-12
Payer: COMMERCIAL

## 2024-09-12 ENCOUNTER — ROUTINE PRENATAL (OUTPATIENT)
Dept: OBGYN CLINIC | Age: 29
End: 2024-09-12
Payer: COMMERCIAL

## 2024-09-12 ENCOUNTER — HOSPITAL ENCOUNTER (OUTPATIENT)
Dept: INFUSION THERAPY | Age: 29
Setting detail: INFUSION SERIES
Discharge: HOME | End: 2024-09-12
Payer: COMMERCIAL

## 2024-09-12 VITALS
HEART RATE: 95 BPM | OXYGEN SATURATION: 95 % | SYSTOLIC BLOOD PRESSURE: 112 MMHG | DIASTOLIC BLOOD PRESSURE: 68 MMHG | BODY MASS INDEX: 29.63 KG/M2 | TEMPERATURE: 97.6 F | RESPIRATION RATE: 18 BRPM | HEIGHT: 62 IN | WEIGHT: 161 LBS

## 2024-09-12 VITALS
SYSTOLIC BLOOD PRESSURE: 115 MMHG | WEIGHT: 161 LBS | BODY MASS INDEX: 29.44 KG/M2 | HEART RATE: 107 BPM | DIASTOLIC BLOOD PRESSURE: 75 MMHG

## 2024-09-12 DIAGNOSIS — Z15.89 HETEROZYGOUS MTHFR MUTATION A1298C: Primary | ICD-10-CM

## 2024-09-12 DIAGNOSIS — Z3A.31 31 WEEKS GESTATION OF PREGNANCY: ICD-10-CM

## 2024-09-12 LAB
GLUCOSE URINE, POC: NORMAL MG/DL
PROTEIN UA: NEGATIVE

## 2024-09-12 PROCEDURE — 76805 OB US >/= 14 WKS SNGL FETUS: CPT | Performed by: OBSTETRICS & GYNECOLOGY

## 2024-09-12 PROCEDURE — 96372 THER/PROPH/DIAG INJ SC/IM: CPT

## 2024-09-12 PROCEDURE — 81002 URINALYSIS NONAUTO W/O SCOPE: CPT | Performed by: OBSTETRICS & GYNECOLOGY

## 2024-09-12 PROCEDURE — 6360000002 HC RX W HCPCS: Performed by: OBSTETRICS & GYNECOLOGY

## 2024-09-12 PROCEDURE — 99213 OFFICE O/P EST LOW 20 MIN: CPT | Performed by: OBSTETRICS & GYNECOLOGY

## 2024-09-12 PROCEDURE — 99999 PR OFFICE/OUTPT VISIT,PROCEDURE ONLY: CPT | Performed by: OBSTETRICS & GYNECOLOGY

## 2024-09-12 RX ADMIN — HUMAN RHO(D) IMMUNE GLOBULIN 300 MCG: 1500 SOLUTION INTRAMUSCULAR; INTRAVENOUS at 12:57

## 2024-09-19 PROBLEM — Z3A.31 31 WEEKS GESTATION OF PREGNANCY: Status: RESOLVED | Noted: 2024-06-26 | Resolved: 2024-09-19

## 2024-09-19 PROBLEM — O09.893 HX OF PRETERM DELIVERY, CURRENTLY PREGNANT, THIRD TRIMESTER: Status: ACTIVE | Noted: 2024-06-26

## 2024-09-25 ENCOUNTER — ANCILLARY PROCEDURE (OUTPATIENT)
Dept: OBGYN CLINIC | Age: 29
End: 2024-09-25
Payer: COMMERCIAL

## 2024-09-25 ENCOUNTER — ROUTINE PRENATAL (OUTPATIENT)
Dept: OBGYN CLINIC | Age: 29
End: 2024-09-25
Payer: COMMERCIAL

## 2024-09-25 ENCOUNTER — HOSPITAL ENCOUNTER (OUTPATIENT)
Age: 29
Discharge: HOME | End: 2024-09-25
Payer: COMMERCIAL

## 2024-09-25 VITALS
SYSTOLIC BLOOD PRESSURE: 106 MMHG | BODY MASS INDEX: 29.84 KG/M2 | WEIGHT: 163.2 LBS | DIASTOLIC BLOOD PRESSURE: 72 MMHG | HEART RATE: 92 BPM

## 2024-09-25 DIAGNOSIS — D68.00 VON WILLEBRAND'S DISEASE (HCC): Chronic | ICD-10-CM

## 2024-09-25 DIAGNOSIS — Z15.89 HETEROZYGOUS MTHFR MUTATION A1298C: Primary | ICD-10-CM

## 2024-09-25 DIAGNOSIS — O09.893 HX OF PRETERM DELIVERY, CURRENTLY PREGNANT, THIRD TRIMESTER: ICD-10-CM

## 2024-09-25 DIAGNOSIS — Z67.91 RH NEGATIVE STATE IN ANTEPARTUM PERIOD: ICD-10-CM

## 2024-09-25 DIAGNOSIS — Z3A.33 33 WEEKS GESTATION OF PREGNANCY: ICD-10-CM

## 2024-09-25 DIAGNOSIS — D68.59 THROMBOPHILIA (HCC): ICD-10-CM

## 2024-09-25 DIAGNOSIS — Z15.89 PAI-1 4G/5G GENOTYPE: ICD-10-CM

## 2024-09-25 DIAGNOSIS — O26.899 RH NEGATIVE STATE IN ANTEPARTUM PERIOD: ICD-10-CM

## 2024-09-25 DIAGNOSIS — O36.0920 RHESUS ISOIMMUNIZATION DURING PREGNANCY IN SECOND TRIMESTER, SINGLE OR UNSPECIFIED FETUS: ICD-10-CM

## 2024-09-25 LAB
GLUCOSE URINE, POC: NEGATIVE MG/DL
PROTEIN UA: POSITIVE

## 2024-09-25 PROCEDURE — 81002 URINALYSIS NONAUTO W/O SCOPE: CPT | Performed by: OBSTETRICS & GYNECOLOGY

## 2024-09-25 PROCEDURE — 85240 CLOT FACTOR VIII AHG 1 STAGE: CPT

## 2024-09-25 PROCEDURE — 99213 OFFICE O/P EST LOW 20 MIN: CPT | Performed by: OBSTETRICS & GYNECOLOGY

## 2024-09-25 PROCEDURE — 76818 FETAL BIOPHYS PROFILE W/NST: CPT | Performed by: OBSTETRICS & GYNECOLOGY

## 2024-09-25 PROCEDURE — 99999 PR OFFICE/OUTPT VISIT,PROCEDURE ONLY: CPT | Performed by: OBSTETRICS & GYNECOLOGY

## 2024-09-25 PROCEDURE — 85245 CLOT FACTOR VIII VW RISTOCTN: CPT

## 2024-09-25 PROCEDURE — 36415 COLL VENOUS BLD VENIPUNCTURE: CPT

## 2024-09-25 PROCEDURE — 85246 CLOT FACTOR VIII VW ANTIGEN: CPT

## 2024-09-27 LAB — FACTOR VIII ACTIVITY: 90 % (ref 50–150)

## 2024-09-28 LAB
VWF AG ACT/NOR PPP IA: 141 % (ref 52–214)
VWF:RCO ACT/NOR PPP PL AGG: 118 % (ref 51–215)

## 2024-09-30 PROBLEM — O36.0930: Status: ACTIVE | Noted: 2024-05-16

## 2024-09-30 PROBLEM — Z3A.33 33 WEEKS GESTATION OF PREGNANCY: Status: RESOLVED | Noted: 2024-09-25 | Resolved: 2024-09-30

## 2024-10-04 ENCOUNTER — OFFICE VISIT (OUTPATIENT)
Dept: ONCOLOGY | Age: 29
End: 2024-10-04
Payer: COMMERCIAL

## 2024-10-04 ENCOUNTER — HOSPITAL ENCOUNTER (OUTPATIENT)
Dept: INFUSION THERAPY | Age: 29
Discharge: HOME | End: 2024-10-04

## 2024-10-04 VITALS
HEART RATE: 108 BPM | OXYGEN SATURATION: 98 % | WEIGHT: 165 LBS | BODY MASS INDEX: 30.36 KG/M2 | DIASTOLIC BLOOD PRESSURE: 76 MMHG | HEIGHT: 62 IN | TEMPERATURE: 98.7 F | SYSTOLIC BLOOD PRESSURE: 110 MMHG

## 2024-10-04 DIAGNOSIS — D68.00 VON WILLEBRAND'S DISEASE (HCC): Primary | ICD-10-CM

## 2024-10-04 PROCEDURE — 99213 OFFICE O/P EST LOW 20 MIN: CPT | Performed by: CLINICAL NURSE SPECIALIST

## 2024-10-04 NOTE — PROGRESS NOTES
Correlation with clinical history and other laboratory data  suggested. If type 2N von Willebrand disease remanins a clinical  concern, VWD Type 2N sequence Analysis or a DDAVP trial with pre, 1  hour and 4 hour post infusion samples for VWF antigen and Factor VIII  would be informative. For questions, please call Dr. Nicolas Reinoso at  [phone] or [phone].    Test performed by:  [facility] [address] [place] [id number]                   2.vWD Type 2N. Baseline studies as of 9/27/23:  F VIII activity 37%  vWF Ag 48%  vWF activity 30       ASSESSMENT/PLAN:    ## von willebrand type 2N ( heterozygous) :   - low factor VIII noted on labs from 8/19: Factor VIII : 17%, with normal Von willebrand antigen and ristocetin co factor . Also results mentioned pattern signifies presence of an inhibitor ( suspect is an outlier or lab error?) .     - reviewing prior notes in [place] and in [facility], patient did have mildly low factor VIII levels at some point.   - she had VWD 2N binding study as copied above which was consistent with heterozygous type 2N ( the bleeding clinical phenotype is usually seen in homozygous individuals in this type)   - based on the above patient levels have been mostly in the normal range especially when checked during her pregnancies, as pregnancy is usually associated with increased factor levels.     - her last number 8/19/2024 of factor VIII seems to be inconsistent with her prior labs, repeat was done and is still pending, but PTT returned  and was normal which is reassuring.      - today she declines having any bleeding or bruising.   - I discussed with her that the above lab can be possibly a lab error or an outlier number, so will repeat the labs. Also the presence of an inhibitor suspicion was discussed which also seems inconsistent.  Discussed if number is still low would recommend further studies including inhibitor studies and titer and if any

## 2024-10-06 ENCOUNTER — HOSPITAL ENCOUNTER (OUTPATIENT)
Age: 29
Discharge: HOME | End: 2024-10-06
Attending: OBSTETRICS & GYNECOLOGY | Admitting: OBSTETRICS & GYNECOLOGY
Payer: COMMERCIAL

## 2024-10-06 VITALS
SYSTOLIC BLOOD PRESSURE: 120 MMHG | DIASTOLIC BLOOD PRESSURE: 77 MMHG | RESPIRATION RATE: 16 BRPM | TEMPERATURE: 98.6 F | HEART RATE: 82 BPM

## 2024-10-06 LAB
BILIRUB UR QL STRIP: NEGATIVE
CLARITY UR: CLEAR
COLOR UR: YELLOW
GLUCOSE UR STRIP-MCNC: NEGATIVE MG/DL
HGB UR QL STRIP.AUTO: NEGATIVE
KETONES UR STRIP-MCNC: ABNORMAL MG/DL
LEUKOCYTE ESTERASE UR QL STRIP: ABNORMAL
NITRITE UR QL STRIP: NEGATIVE
PH UR STRIP: 6.5 [PH] (ref 5–9)
PROT UR STRIP-MCNC: NEGATIVE MG/DL
RBC #/AREA URNS HPF: ABNORMAL /HPF
SP GR UR STRIP: 1.01 (ref 1–1.03)
UROBILINOGEN UR STRIP-ACNC: 0.2 EU/DL (ref 0–1)
WBC #/AREA URNS HPF: ABNORMAL /HPF

## 2024-10-06 PROCEDURE — 81001 URINALYSIS AUTO W/SCOPE: CPT

## 2024-10-06 RX ORDER — CEPHALEXIN 500 MG/1
500 CAPSULE ORAL 4 TIMES DAILY
Qty: 20 CAPSULE | Refills: 0 | Status: SHIPPED | OUTPATIENT
Start: 2024-10-06 | End: 2024-10-11

## 2024-10-06 NOTE — H&P
Department of Obstetrics and Gynecology  Attending Obstetrics History and Physical      HISTORY OF PRESENT ILLNESS:      The patient is a 29 y.o.  4 parity 1 at 35 weeks 0 days.  Complaining of ctx's. States was 2-3 cm in office    Estimated Due Date:  11/10/24  Contractions:  Yes  Leaking of fluid: no  nfm  Blleeding:  No    PRENATAL CARE:    Complications: von willebrand      Active Problems:    * No active hospital problems. *  Resolved Problems:    * No resolved hospital problems. *        PAST OB HISTORY    OB History    Para Term  AB Living   4 1   1 2 1   SAB IAB Ectopic Molar Multiple Live Births   2         1      # Outcome Date GA Lbr Kilo/2nd Weight Sex Type Anes PTL Lv   4 Current            3 SAB 24 5w0d          2  22 35w6d  2.665 kg (5 lb 14 oz) M Vag-Spont EPI Y LORI      Complications:  Labor   1 2021 6w0d       ND           Pre-eclampsia:  No      :  No      D & C:  No      Cerclage:  No      LEEP:  No      Myomectomy:  No       Labor: No    Past Medical History:    Past Medical History:   Diagnosis Date    Anxiety 2021    BRCA2 gene mutation positive 2020    Breast disorder 10/2022    Results were benign after a mass was found. 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

## 2024-10-07 ENCOUNTER — ANCILLARY PROCEDURE (OUTPATIENT)
Dept: OBGYN CLINIC | Age: 29
End: 2024-10-07
Payer: COMMERCIAL

## 2024-10-07 ENCOUNTER — ROUTINE PRENATAL (OUTPATIENT)
Dept: OBGYN CLINIC | Age: 29
End: 2024-10-07
Payer: COMMERCIAL

## 2024-10-07 VITALS
HEART RATE: 114 BPM | BODY MASS INDEX: 30.54 KG/M2 | SYSTOLIC BLOOD PRESSURE: 108 MMHG | DIASTOLIC BLOOD PRESSURE: 73 MMHG | WEIGHT: 167 LBS

## 2024-10-07 DIAGNOSIS — Z15.89 HETEROZYGOUS MTHFR MUTATION A1298C: ICD-10-CM

## 2024-10-07 DIAGNOSIS — O09.893 HX OF PRETERM DELIVERY, CURRENTLY PREGNANT, THIRD TRIMESTER: ICD-10-CM

## 2024-10-07 DIAGNOSIS — D68.00 VON WILLEBRAND DISEASE (HCC): ICD-10-CM

## 2024-10-07 DIAGNOSIS — D68.59 THROMBOPHILIA (HCC): Primary | ICD-10-CM

## 2024-10-07 DIAGNOSIS — Z3A.35 35 WEEKS GESTATION OF PREGNANCY: ICD-10-CM

## 2024-10-07 DIAGNOSIS — Z15.89 PAI-1 4G/5G GENOTYPE: ICD-10-CM

## 2024-10-07 DIAGNOSIS — Z92.89: ICD-10-CM

## 2024-10-07 DIAGNOSIS — O36.0930 RHESUS ISOIMMUNIZATION DURING PREGNANCY IN THIRD TRIMESTER, SINGLE OR UNSPECIFIED FETUS: ICD-10-CM

## 2024-10-07 LAB
GLUCOSE URINE, POC: NORMAL MG/DL
PROTEIN UA: NEGATIVE

## 2024-10-07 PROCEDURE — 81002 URINALYSIS NONAUTO W/O SCOPE: CPT | Performed by: OBSTETRICS & GYNECOLOGY

## 2024-10-07 PROCEDURE — 76818 FETAL BIOPHYS PROFILE W/NST: CPT | Performed by: OBSTETRICS & GYNECOLOGY

## 2024-10-07 PROCEDURE — 99213 OFFICE O/P EST LOW 20 MIN: CPT | Performed by: OBSTETRICS & GYNECOLOGY

## 2024-10-07 NOTE — PROGRESS NOTES
Dr Pulliam called and updated on SVE unchanged, and urinalysis results. Orders for keflex 500mg twice and day for 7 days and that patient may be sent home.

## 2024-10-07 NOTE — PROGRESS NOTES
Care of patient taken over. Call light within reach. Patient still c/o contractions. Denies leaking of fluid, vaginal bleeding. +FM. Urinalysis sent.

## 2024-10-07 NOTE — PROGRESS NOTES
Farhana Hughes presents to office today for bpp/nst.  Patient was in  L&D last night with contractions. Told she has a UTI, started on keflex. Positive fetal movement.

## 2024-10-07 NOTE — PROGRESS NOTES
[facility] MATERNAL FETAL MEDICINE   [facility] [place] SPECIALTY CARE [facility] MATERNAL FETAL MEDICINE  [address] [address]  Dept: [phone]  Loc: [phone]     10/7/2024    RE:  Farhana Hughes     : 1995   AGE: 29 y.o.     Dear Dr. Alberts,    Thank you for allowing me to see Farhana Hughes.  As I'm sure you will recall, Farhana Hughes is a 29 y.o. [id number]'s last menstrual period was 2023 (exact date). Estimated Date of Delivery: 11/10/24 at 35w1d seen in our office today for the following:    REASON FOR VISIT: BPP and NST    Patient Active Problem List    Diagnosis Date Noted    Narcolepsy without cataplexy 2023    35 weeks gestation of pregnancy 10/07/2024    Thrombophilia (HCC) 2024    Subchorionic hematoma in second trimester 2024    Hx of  delivery, currently pregnant, third trimester 2024    Von Willebrand disease (HCC) 2024    Heterozygous MTHFR mutation [id number] 2024    JHON-1 4G/5G genotype 2024    Rhesus isoimmunization during pregnancy in third trimester 2024    Fourth pregnancy 2024    Hx of transfusion 2024    Rh negative state in antepartum period 2021    Von Willebrand's disease (HCC) 2021    History of kidney stones 2021    BRCA gene positive 2021    Anxiety 2021        PAST HISTORY:  OB History    Para Term  AB Living   4 1   1 2 1   SAB IAB Ectopic Molar Multiple Live Births   2         1      # Outcome Date GA Lbr Kilo/2nd Weight Sex Type Anes PTL Lv   4 Current            3 SAB 24 5w0d          2  22 35w6d  2.665 kg (5 lb 14 oz) M Vag-Spont EPI Y LORI      Complications:  Labor   1 SAB 2021 6w0d       ND          MEDICAL:  Past Medical History:   Diagnosis Date    Anxiety 2021    BRCA2 gene mutation positive 2020    Breast disorder 10/2022    Results were

## 2024-10-10 ENCOUNTER — HOSPITAL ENCOUNTER (OUTPATIENT)
Dept: LABOR AND DELIVERY | Age: 29
Discharge: HOME | End: 2024-10-10

## 2024-10-14 ENCOUNTER — ANCILLARY PROCEDURE (OUTPATIENT)
Dept: OBGYN CLINIC | Age: 29
End: 2024-10-14
Payer: COMMERCIAL

## 2024-10-14 ENCOUNTER — ROUTINE PRENATAL (OUTPATIENT)
Dept: OBGYN CLINIC | Age: 29
End: 2024-10-14
Payer: COMMERCIAL

## 2024-10-14 VITALS
WEIGHT: 171.7 LBS | DIASTOLIC BLOOD PRESSURE: 72 MMHG | HEART RATE: 93 BPM | BODY MASS INDEX: 31.4 KG/M2 | SYSTOLIC BLOOD PRESSURE: 105 MMHG

## 2024-10-14 DIAGNOSIS — Z34.90 FOURTH PREGNANCY: ICD-10-CM

## 2024-10-14 DIAGNOSIS — Z3A.36 36 WEEKS GESTATION OF PREGNANCY: ICD-10-CM

## 2024-10-14 DIAGNOSIS — Z15.89 PAI-1 4G/5G GENOTYPE: ICD-10-CM

## 2024-10-14 DIAGNOSIS — O36.0930 RHESUS ISOIMMUNIZATION DURING PREGNANCY IN THIRD TRIMESTER, SINGLE OR UNSPECIFIED FETUS: ICD-10-CM

## 2024-10-14 DIAGNOSIS — Z15.89 HETEROZYGOUS MTHFR MUTATION A1298C: ICD-10-CM

## 2024-10-14 DIAGNOSIS — O09.893 HX OF PRETERM DELIVERY, CURRENTLY PREGNANT, THIRD TRIMESTER: ICD-10-CM

## 2024-10-14 DIAGNOSIS — D68.00 VON WILLEBRAND DISEASE (HCC): ICD-10-CM

## 2024-10-14 DIAGNOSIS — D68.59 THROMBOPHILIA (HCC): Primary | ICD-10-CM

## 2024-10-14 LAB
GLUCOSE URINE, POC: NEGATIVE MG/DL
PROTEIN UA: NEGATIVE

## 2024-10-14 PROCEDURE — 99213 OFFICE O/P EST LOW 20 MIN: CPT | Performed by: OBSTETRICS & GYNECOLOGY

## 2024-10-14 PROCEDURE — 76805 OB US >/= 14 WKS SNGL FETUS: CPT | Performed by: OBSTETRICS & GYNECOLOGY

## 2024-10-14 PROCEDURE — 81002 URINALYSIS NONAUTO W/O SCOPE: CPT | Performed by: OBSTETRICS & GYNECOLOGY

## 2024-10-14 PROCEDURE — 99999 PR OFFICE/OUTPT VISIT,PROCEDURE ONLY: CPT | Performed by: OBSTETRICS & GYNECOLOGY

## 2024-10-14 PROCEDURE — 76818 FETAL BIOPHYS PROFILE W/NST: CPT | Performed by: OBSTETRICS & GYNECOLOGY

## 2024-10-14 NOTE — PATIENT INSTRUCTIONS
Please arrive for your scheduled appointment at least 15 minutes early with your actual insurance card+ a photo ID. Also if you need any refills ordered or have questions, it may take up 48 hours to reply. Please allow ample time for your refills. Call me when you use last refill. Thank you for your cooperation. You might be having an NST at your next appt. Please eat a large snack or breakfast before coming to office. Thank youCall your primary obstetrician with bleeding, leaking of fluid, abdominal tenderness, headache, blurry vision, epigastric pain and increased urinary frequency.If you are experiencing an emergency and need immediate help, call 911 or go to go emergency room or labor and delivery. Do kick counts after dinner. Call your primary obstetrician if less than 10 kicks in 2 hours after dinner.     Call your primary obstetrician with bleeding, leaking of fluid, abdominal tenderness, headache, blurry vision, epigastric pain and increased urinary frequency.if you are sick, not feeling well or have an infectious process going on please reschedule your appointment by calling [phone]. Also if any family members are not feeling well, please do not bring them to your appointment. We appreciate your cooperation. We are doing this in order to protect our pregnant mothers+ their babies.if you are sick, not feeling well or have an infectious process going on please reschedule your appointment by calling [phone]. Also if any family members are not feeling well, please do not bring them to your appointment. We appreciate your cooperation. We are doing this in order to protect our pregnant mothers+ their babies.   Problem: MOBILITY - ADULT  Goal: Maintain or return to baseline ADL function  Description: INTERVENTIONS:  -  Assess patient's ability to carry out ADLs; assess patient's baseline for ADL function and identify physical deficits which impact ability to perform ADLs (bathing, care of mouth/teeth, toileting, grooming, dressing, etc.)  - Assess/evaluate cause of self-care deficits   - Assess range of motion  - Assess patient's mobility; develop plan if impaired  - Assess patient's need for assistive devices and provide as appropriate  - Encourage maximum independence but intervene and supervise when necessary  - Involve family in performance of ADLs  - Assess for home care needs following discharge   - Consider OT consult to assist with ADL evaluation and planning for discharge  - Provide patient education as appropriate  Outcome: Progressing  Goal: Maintains/Returns to pre admission functional level  Description: INTERVENTIONS:  - Perform BMAT or MOVE assessment daily.   - Set and communicate daily mobility goal to care team and patient/family/caregiver. - Collaborate with rehabilitation services on mobility goals if consulted  - Perform Range of Motion 3 times a day. - Reposition patient every 2 hours. - Dangle patient 3 times a day  - Record patient progress and toleration of activity level   Outcome: Progressing     Problem: Nutrition  Goal: Nutrition/Hydration status is improving  Description: Monitor and assess patient's nutrition/hydration status for malnutrition (ex- brittle hair, bruises, dry skin, pale skin and conjunctiva, muscle wasting, smooth red tongue, and disorientation). Collaborate with interdisciplinary team and initiate plan and interventions as ordered. Monitor patient's weight and dietary intake as ordered or per policy. Utilize nutrition screening tool and intervene per policy. Determine patient's food preferences and provide high-protein, high-caloric foods as appropriate.      - Assist patient with eating.  - Allow adequate time for meals.  - Encourage patient to take dietary supplement as ordered. - Collaborate with clinical nutritionist.  - Include patient/family/caregiver in decisions related to nutrition.   Outcome: Progressing no

## 2024-10-14 NOTE — PROGRESS NOTES
Pt alert and pleasant, here for bpp/nst.  Pt denies bleeding and lof. Pt is having some cramping and leidy kat. They are not frequent, but more intense. Pt had a UTI last week and just finished up her antibiotic for it yesterday. Positive fetal movement per patient.  
Difficulty of Paying Living Expenses: Not hard at all   Food Insecurity: No Food Insecurity (10/6/2024)    Hunger Vital Sign     Worried About Running Out of Food in the Last Year: Never true     Ran Out of Food in the Last Year: Never true   Transportation Needs: No Transportation Needs (10/6/2024)    PRAPARE - Transportation     Lack of Transportation (Medical): No     Lack of Transportation (Non-Medical): No   Physical Activity: Insufficiently Active (2022)    Exercise Vital Sign     Days of Exercise per Week: 1 day     Minutes of Exercise per Session: 30 min   Intimate Partner Violence: Not At Risk (2022)    Humiliation, Afraid, Rape, and Kick questionnaire     Fear of Current or Ex-Partner: No     Emotionally Abused: No     Physically Abused: No     Sexually Abused: No   Housing Stability: Low Risk  (10/6/2024)    Housing Stability Vital Sign     Unable to Pay for Housing in the Last Year: No     Number of Times Moved in the Last Year: 1     Homeless in the Last Year: No          FAMILY MEDICAL HISTORY:   Family History   Problem Relation Age of Onset    Bleeding Prob Mother     Heart Disease Father     Prostate Cancer Father     Cancer Father 60        prostate    Breast Cancer Sister 50        pos BRCA 2     Other Sister 48        +brca 2    Other Sister         Positive BRCA 2     Labor Sister     Cancer Maternal Grandfather         stomach    Pancreatic Cancer Maternal Aunt      Labor Sister           PHYSICAL EXAMINATION:  /72   Pulse 93   Wt 77.9 kg (171 lb 11.2 oz)   LMP 2023 (Exact Date)   Body mass index is 31.4 kg/m².    Urine dipstick:  Results for orders placed or performed in visit on 10/14/24   POCT urine qual dipstick protein   Result Value Ref Range    Protein, UA Negative Negative   POCT urine qual dipstick glucose   Result Value Ref Range    Glucose, UA POC Negative mg/dL        A/an growth, BPP, NST ultrasound was done in our office today. Please refer

## 2024-10-15 PROBLEM — Z3A.36 36 WEEKS GESTATION OF PREGNANCY: Status: RESOLVED | Noted: 2024-10-14 | Resolved: 2024-10-15

## 2024-10-20 ENCOUNTER — ANCILLARY PROCEDURE (OUTPATIENT)
Dept: OBGYN CLINIC | Age: 29
End: 2024-10-20
Payer: COMMERCIAL

## 2024-10-20 ENCOUNTER — HOSPITAL ENCOUNTER (OUTPATIENT)
Age: 29
Setting detail: OBSERVATION
Discharge: HOME | End: 2024-10-20
Attending: OBSTETRICS & GYNECOLOGY | Admitting: OBSTETRICS & GYNECOLOGY
Payer: COMMERCIAL

## 2024-10-20 VITALS
SYSTOLIC BLOOD PRESSURE: 124 MMHG | OXYGEN SATURATION: 92 % | BODY MASS INDEX: 31.65 KG/M2 | DIASTOLIC BLOOD PRESSURE: 74 MMHG | HEIGHT: 62 IN | TEMPERATURE: 98.1 F | HEART RATE: 70 BPM | RESPIRATION RATE: 17 BRPM | WEIGHT: 172 LBS

## 2024-10-20 PROBLEM — O99.113 THROMBOPHILIA AFFECTING PREGNANCY IN THIRD TRIMESTER, ANTEPARTUM (HCC): Status: ACTIVE | Noted: 2024-10-20

## 2024-10-20 PROBLEM — Z3A.37 37 WEEKS GESTATION OF PREGNANCY: Status: ACTIVE | Noted: 2024-10-20

## 2024-10-20 PROBLEM — D68.59 THROMBOPHILIA AFFECTING PREGNANCY IN THIRD TRIMESTER, ANTEPARTUM (HCC): Status: ACTIVE | Noted: 2024-10-20

## 2024-10-20 PROBLEM — R76.8 RED BLOOD CELL ANTIBODY POSITIVE: Status: ACTIVE | Noted: 2024-10-20

## 2024-10-20 LAB
ALBUMIN SERPL-MCNC: 3.2 G/DL (ref 3.5–5.2)
ALP SERPL-CCNC: 132 U/L (ref 35–104)
ALT SERPL-CCNC: 12 U/L (ref 0–32)
ANION GAP SERPL CALCULATED.3IONS-SCNC: 10 MMOL/L (ref 7–16)
AST SERPL-CCNC: 19 U/L (ref 0–31)
BASOPHILS # BLD: 0.03 K/UL (ref 0–0.2)
BASOPHILS NFR BLD: 0 % (ref 0–2)
BILIRUB SERPL-MCNC: <0.2 MG/DL (ref 0–1.2)
BILIRUB UR QL STRIP: NEGATIVE
BUN SERPL-MCNC: 7 MG/DL (ref 6–20)
CALCIUM SERPL-MCNC: 8.2 MG/DL (ref 8.6–10.2)
CHLORIDE SERPL-SCNC: 108 MMOL/L (ref 98–107)
CLARITY UR: CLEAR
CO2 SERPL-SCNC: 19 MMOL/L (ref 22–29)
COLOR UR: YELLOW
CREAT SERPL-MCNC: 0.6 MG/DL (ref 0.5–1)
CREAT UR-MCNC: 65.2 MG/DL (ref 29–226)
EOSINOPHIL # BLD: 0.08 K/UL (ref 0.05–0.5)
EOSINOPHILS RELATIVE PERCENT: 1 % (ref 0–6)
ERYTHROCYTE [DISTWIDTH] IN BLOOD BY AUTOMATED COUNT: 13.5 % (ref 11.5–15)
GFR, ESTIMATED: >90 ML/MIN/1.73M2
GLUCOSE SERPL-MCNC: 86 MG/DL (ref 74–99)
GLUCOSE UR STRIP-MCNC: NEGATIVE MG/DL
HCT VFR BLD AUTO: 33.1 % (ref 34–48)
HGB BLD-MCNC: 10.7 G/DL (ref 11.5–15.5)
HGB UR QL STRIP.AUTO: NEGATIVE
IMM GRANULOCYTES # BLD AUTO: 0.12 K/UL (ref 0–0.58)
IMM GRANULOCYTES NFR BLD: 1 % (ref 0–5)
KETONES UR STRIP-MCNC: NEGATIVE MG/DL
LEUKOCYTE ESTERASE UR QL STRIP: ABNORMAL
LYMPHOCYTES NFR BLD: 2.08 K/UL (ref 1.5–4)
LYMPHOCYTES RELATIVE PERCENT: 21 % (ref 20–42)
MCH RBC QN AUTO: 28 PG (ref 26–35)
MCHC RBC AUTO-ENTMCNC: 32.3 G/DL (ref 32–34.5)
MCV RBC AUTO: 86.6 FL (ref 80–99.9)
MONOCYTES NFR BLD: 0.85 K/UL (ref 0.1–0.95)
MONOCYTES NFR BLD: 9 % (ref 2–12)
NEUTROPHILS NFR BLD: 68 % (ref 43–80)
NEUTS SEG NFR BLD: 6.85 K/UL (ref 1.8–7.3)
NITRITE UR QL STRIP: NEGATIVE
PH UR STRIP: 7 [PH] (ref 5–9)
PLATELET # BLD AUTO: 204 K/UL (ref 130–450)
PMV BLD AUTO: 11.1 FL (ref 7–12)
POTASSIUM SERPL-SCNC: 4 MMOL/L (ref 3.5–5)
PROT SERPL-MCNC: 5.5 G/DL (ref 6.4–8.3)
PROT UR STRIP-MCNC: NEGATIVE MG/DL
RBC # BLD AUTO: 3.82 M/UL (ref 3.5–5.5)
RBC #/AREA URNS HPF: ABNORMAL /HPF
SODIUM SERPL-SCNC: 137 MMOL/L (ref 132–146)
SP GR UR STRIP: 1.01 (ref 1–1.03)
TOTAL PROTEIN, URINE: 11 MG/DL (ref 0–12)
URATE SERPL-MCNC: 4.3 MG/DL (ref 2.4–5.7)
URINE TOTAL PROTEIN CREATININE RATIO: 0.17 (ref 0–0.2)
UROBILINOGEN UR STRIP-ACNC: 0.2 EU/DL (ref 0–1)
WBC #/AREA URNS HPF: ABNORMAL /HPF
WBC OTHER # BLD: 10 K/UL (ref 4.5–11.5)

## 2024-10-20 PROCEDURE — 80053 COMPREHEN METABOLIC PANEL: CPT

## 2024-10-20 PROCEDURE — 82570 ASSAY OF URINE CREATININE: CPT

## 2024-10-20 PROCEDURE — 76820 UMBILICAL ARTERY ECHO: CPT | Performed by: OBSTETRICS & GYNECOLOGY

## 2024-10-20 PROCEDURE — G0378 HOSPITAL OBSERVATION PER HR: HCPCS

## 2024-10-20 PROCEDURE — 81001 URINALYSIS AUTO W/SCOPE: CPT

## 2024-10-20 PROCEDURE — 84550 ASSAY OF BLOOD/URIC ACID: CPT

## 2024-10-20 PROCEDURE — 6370000000 HC RX 637 (ALT 250 FOR IP): Performed by: ADVANCED PRACTICE MIDWIFE

## 2024-10-20 PROCEDURE — 99232 SBSQ HOSP IP/OBS MODERATE 35: CPT | Performed by: OBSTETRICS & GYNECOLOGY

## 2024-10-20 PROCEDURE — 87086 URINE CULTURE/COLONY COUNT: CPT

## 2024-10-20 PROCEDURE — 76821 MIDDLE CEREBRAL ARTERY ECHO: CPT | Performed by: OBSTETRICS & GYNECOLOGY

## 2024-10-20 PROCEDURE — 76819 FETAL BIOPHYS PROFIL W/O NST: CPT | Performed by: OBSTETRICS & GYNECOLOGY

## 2024-10-20 PROCEDURE — 85025 COMPLETE CBC W/AUTO DIFF WBC: CPT

## 2024-10-20 PROCEDURE — 99221 1ST HOSP IP/OBS SF/LOW 40: CPT | Performed by: ADVANCED PRACTICE MIDWIFE

## 2024-10-20 PROCEDURE — 84156 ASSAY OF PROTEIN URINE: CPT

## 2024-10-20 PROCEDURE — 99213 OFFICE O/P EST LOW 20 MIN: CPT

## 2024-10-20 PROCEDURE — 76815 OB US LIMITED FETUS(S): CPT | Performed by: OBSTETRICS & GYNECOLOGY

## 2024-10-20 RX ORDER — NITROFURANTOIN 25; 75 MG/1; MG/1
100 CAPSULE ORAL EVERY 12 HOURS SCHEDULED
Status: DISCONTINUED | OUTPATIENT
Start: 2024-10-20 | End: 2024-10-20 | Stop reason: HOSPADM

## 2024-10-20 RX ORDER — NITROFURANTOIN 25; 75 MG/1; MG/1
100 CAPSULE ORAL EVERY 12 HOURS SCHEDULED
Qty: 13 CAPSULE | Refills: 0 | Status: ON HOLD | OUTPATIENT
Start: 2024-10-21 | End: 2024-10-24

## 2024-10-20 RX ORDER — ACETAMINOPHEN 325 MG/1
650 TABLET ORAL EVERY 4 HOURS PRN
Status: DISCONTINUED | OUTPATIENT
Start: 2024-10-20 | End: 2024-10-20 | Stop reason: HOSPADM

## 2024-10-20 RX ADMIN — NITROFURANTOIN MONOHYDRATE/MACROCRYSTALS 100 MG: 75; 25 CAPSULE ORAL at 16:20

## 2024-10-20 NOTE — H&P
CHIEF COMPLAINT:  came in with headache, diarrhea, elevated b/p and swelling. Cramping  irregular no change. +fm    HISTORY OF PRESENT ILLNESS:      The patient is a 29 y.o. female at 37w0d.  OB History          4    Para   1    Term           1    AB   2    Living   1         SAB   2    IAB        Ectopic        Molar        Multiple        Live Births   1            Patient presents with a chief complaint as above and is being admitted for observation    Estimated Due Date: Estimated Date of Delivery: 11/10/24    PRENATAL CARE:    Complicated by: Von Willdebrands, MTHFR, hx miscarriage    PAST OB HISTORY  OB History          4    Para   1    Term           1    AB   2    Living   1         SAB   2    IAB        Ectopic        Molar        Multiple        Live Births   1                Past Medical History:        Diagnosis Date    Anxiety 2021    BRCA2 gene mutation positive 2020    Breast disorder 10/2022    Results were benign after a mass was found. 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  Social History:    Social History     Socioeconomic History

## 2024-10-20 NOTE — DISCHARGE INSTRUCTIONS
Home Undelivered Discharge Instructions    After Discharge Orders:    Future Appointments   Date Time Provider Department Center   10/21/2024  1:45 PM SCHEDULE, GLO COLLADO RM 2 BDM MFM [facility]   10/23/2024 11:40 AM Berto Alberts MD AFLKOULIANOS [facility]   10/28/2024  1:45 PM SCHEDULE, GLO COLLADO RM 2 BDM MFM [facility]   10/30/2024 12:50 PM Berto Alberts MD AFLKOULIANOS [facility]   11/3/2024  8:00 PM SEB L&D GEN RM 01 SEBZ OP LD [facility]   11/4/2024  2:15 PM SCHEDULE, NATALEENGA HEAVEN RM 2 BDM MFM [facility]   11/22/2024 11:00 AM Casandra Abdul APRN SE[facility] MED ONC [facility]   11/22/2024 11:15 AM SEBZ MED ONC CHAIR 5 SEBZ Med Onc [facility]                     Diet:  normal diet as tolerated    Rest: normal activity as tolerated    Other instructions: Do kick counts once a day on your baby. Choose the time of day your baby is most active. Get in a comfortable lying or sitting position and time how long it takes to feel 10 kicks, twists, turns, swishes, or rolls. Call your physician or midwife if there have not been 10 kicks in 1 hours    Call physician or midwife, return to Labor and Delivery, call 911, or go to the nearest Emergency Room if: increased leakage or fluid, contractions more than  6 per  1 hour, decreased fetal movement, persistent low back pain or cramping, bleeding from vaginal area, difficulty urinating, pain with urination, difficulty breathing, new calf pain, persistent headache, or vision change

## 2024-10-20 NOTE — CONSULTS
the above documentation  Ordering medications, tests, and/or procedures  Formulating the assessment/plan and reviewing the rationale for the above recommendations  Reviewing available records, results of all previously ordered testing/procedures, and current problem list  Counseling/educating the patient  Coordinating care with other healthcare professionals  Communicating results to the patient's family/caregiver  Documenting clinical information in the patient's electronic health record   I answered all of her questions to her satisfaction.   I asked her to call if she had any additional questions.      If you have any questions regarding her management, please contact me at your convenience and thank you for allowing me to participate in her care.    Sincerely,        Prakash Campos MD, MS, FACOG, FACS, RDCS, RDMS, RVT  Director Maternal-Fetal Medicine  [facility]  [phone]

## 2024-10-20 NOTE — PROGRESS NOTES
Spoke with Dr Kuhn notified that Dr Campos rounded on patient and from his standpoint okay to be discharged home. Notified patient sees Dr Alberts on Wednesday. States for patient to continue Macrobid 100mg BID for 7 days

## 2024-10-20 NOTE — PROGRESS NOTES
, 37.0 presents to unit for elevated blood pressures at home, swelling in bilateral extremeties, diarrhea, headache and abdominal cramping. Patient reported that blood pressures were 130s/90s with at home blood pressure cuff. Patient denies leaking of fluid, denies vaginal bleeding, states positive fetal movement. States occasional cramping that has been on-going for 2 weeks now. Blood pressure cuff applied and continuous. Call light in reach.

## 2024-10-21 ENCOUNTER — ANCILLARY PROCEDURE (OUTPATIENT)
Dept: OBGYN CLINIC | Age: 29
End: 2024-10-21
Payer: COMMERCIAL

## 2024-10-21 ENCOUNTER — ROUTINE PRENATAL (OUTPATIENT)
Dept: OBGYN CLINIC | Age: 29
End: 2024-10-21
Payer: COMMERCIAL

## 2024-10-21 VITALS
WEIGHT: 171.8 LBS | BODY MASS INDEX: 31.42 KG/M2 | HEART RATE: 102 BPM | SYSTOLIC BLOOD PRESSURE: 107 MMHG | DIASTOLIC BLOOD PRESSURE: 73 MMHG

## 2024-10-21 DIAGNOSIS — D68.00 VON WILLEBRAND'S DISEASE (HCC): Chronic | ICD-10-CM

## 2024-10-21 DIAGNOSIS — O36.0930 RHESUS ISOIMMUNIZATION DURING PREGNANCY IN THIRD TRIMESTER, SINGLE OR UNSPECIFIED FETUS: ICD-10-CM

## 2024-10-21 DIAGNOSIS — O09.893 HX OF PRETERM DELIVERY, CURRENTLY PREGNANT, THIRD TRIMESTER: ICD-10-CM

## 2024-10-21 DIAGNOSIS — Z15.89 PAI-1 4G/5G GENOTYPE: ICD-10-CM

## 2024-10-21 DIAGNOSIS — Z34.90 FOURTH PREGNANCY: ICD-10-CM

## 2024-10-21 DIAGNOSIS — D68.59 THROMBOPHILIA (HCC): ICD-10-CM

## 2024-10-21 DIAGNOSIS — Z15.89 HETEROZYGOUS MTHFR MUTATION A1298C: Primary | ICD-10-CM

## 2024-10-21 DIAGNOSIS — Z92.89: ICD-10-CM

## 2024-10-21 LAB
GLUCOSE URINE, POC: NEGATIVE MG/DL
MICROORGANISM SPEC CULT: NO GROWTH
PROTEIN UA: NEGATIVE
SERVICE CMNT-IMP: NORMAL
SPECIMEN DESCRIPTION: NORMAL

## 2024-10-21 PROCEDURE — 81002 URINALYSIS NONAUTO W/O SCOPE: CPT | Performed by: OBSTETRICS & GYNECOLOGY

## 2024-10-21 PROCEDURE — 76818 FETAL BIOPHYS PROFILE W/NST: CPT | Performed by: OBSTETRICS & GYNECOLOGY

## 2024-10-21 PROCEDURE — 99999 PR OFFICE/OUTPT VISIT,PROCEDURE ONLY: CPT | Performed by: OBSTETRICS & GYNECOLOGY

## 2024-10-21 PROCEDURE — 99213 OFFICE O/P EST LOW 20 MIN: CPT | Performed by: OBSTETRICS & GYNECOLOGY

## 2024-10-21 NOTE — PROGRESS NOTES
Pt alert and pleasant, here for bpp/nst.  Pt was prescribed Macrobid, her urine culture came back negative does she need to  script and take it? Pt denies bleeding and lof. Pt is having cramping and inconsistent contractions. Positive fetal movement per patient.

## 2024-10-21 NOTE — PROGRESS NOTES
[facility] MATERNAL FETAL MEDICINE   [facility] [place] [facility]  [address] [facility] [address]  Dept: [phone]  Loc: [phone]     10/21/2024    RE:  Farhana Hughes     : 1995   AGE: 29 y.o.     Dear Dr. Alberts,    Thank you for allowing me to see Farhana Hughes.  As I'm sure you will recall, Farhana Hughes is a 29 y.o. [id number]'s last menstrual period was 2023 (exact date). Estimated Date of Delivery: 11/10/24 at 37w1d seen in our office today for the following:    REASON FOR VISIT: BPP and NST    Patient Active Problem List    Diagnosis Date Noted    Narcolepsy without cataplexy 2023    37 weeks gestation of pregnancy 10/20/2024    Thrombophilia affecting pregnancy in third trimester, antepartum ([facility]) 10/20/2024    Red blood cell antibody positive 10/20/2024    Thrombophilia ([facility]) 2024    Subchorionic hematoma in second trimester 2024    Hx of  delivery, currently pregnant, third trimester 2024    Von Willebrand disease ([facility]) 2024    Heterozygous MTHFR mutation [id number] 2024    JHON-1 4G/5G genotype 2024    Rhesus isoimmunization during pregnancy in third trimester 2024    Fourth pregnancy 2024    Hx of transfusion 2024    Rh negative state in antepartum period 2021    Von Willebrand's disease ([facility]) 2021    History of kidney stones 2021    BRCA gene positive 2021    Anxiety 2021        PAST HISTORY:  OB History    Para Term  AB Living   4 1   1 2 1   SAB IAB Ectopic Molar Multiple Live Births   2         1      # Outcome Date GA Lbr Klio/2nd Weight Sex Type Anes PTL Lv   4 Current            3 SAB 24 5w0d          2  22 35w6d  2.665 kg (5 lb 14 oz) M Vag-Spont EPI Y LORI      Complications:  Labor   1 SAB 2021 6w0d       ND          MEDICAL:  Past Medical

## 2024-10-21 NOTE — PATIENT INSTRUCTIONS
Please arrive for your scheduled appointment at least 15 minutes early with your actual insurance card+ a photo ID. Also if you need any refills ordered or have questions, it may take up 48 hours to reply. Please allow ample time for your refills. Call me when you use last refill. Thank you for your cooperation. You might be having an NST at your next appt. Please eat a large snack or breakfast before coming to office. Thank youCall your primary obstetrician with bleeding, leaking of fluid, abdominal tenderness, headache, blurry vision, epigastric pain and increased urinary frequency.If you are experiencing an emergency and need immediate help, call 911 or go to go emergency room or labor and delivery. Do kick counts after dinner. Call your primary obstetrician if less than 10 kicks in 2 hours after dinner.     Call your primary obstetrician with bleeding, leaking of fluid, abdominal tenderness, headache, blurry vision, epigastric pain and increased urinary frequency.if you are sick, not feeling well or have an infectious process going on please reschedule your appointment by calling [phone]. Also if any family members are not feeling well, please do not bring them to your appointment. We appreciate your cooperation. We are doing this in order to protect our pregnant mothers+ their babies.if you are sick, not feeling well or have an infectious process going on please reschedule your appointment by calling [phone]. Also if any family members are not feeling well, please do not bring them to your appointment. We appreciate your cooperation. We are doing this in order to protect our pregnant mothers+ their babies.

## 2024-10-21 NOTE — PROGRESS NOTES
Reviewed and educated patient on discharge paperwork. Questions answered, teach back performed and patient verbalized understanding. Patient left unit ambulatory with .

## 2024-10-24 ENCOUNTER — ANESTHESIA (OUTPATIENT)
Dept: LABOR AND DELIVERY | Age: 29
End: 2024-10-24
Payer: COMMERCIAL

## 2024-10-24 ENCOUNTER — ANESTHESIA EVENT (OUTPATIENT)
Dept: LABOR AND DELIVERY | Age: 29
End: 2024-10-24
Payer: COMMERCIAL

## 2024-10-24 ENCOUNTER — HOSPITAL ENCOUNTER (INPATIENT)
Age: 29
LOS: 2 days | Discharge: HOME | End: 2024-10-26
Attending: OBSTETRICS & GYNECOLOGY | Admitting: OBSTETRICS & GYNECOLOGY
Payer: COMMERCIAL

## 2024-10-24 LAB
ABO + RH BLD: NORMAL
AMPHET UR QL SCN: NEGATIVE
ANTIBODY IDENTIFICATION: NORMAL
ARM BAND NUMBER: NORMAL
BARBITURATES UR QL SCN: NEGATIVE
BENZODIAZ UR QL: NEGATIVE
BLOOD BANK COMMENT: NORMAL
BLOOD BANK SAMPLE EXPIRATION: NORMAL
BLOOD GROUP ANTIBODIES SERPL: POSITIVE
BUPRENORPHINE UR QL: NEGATIVE
CANNABINOIDS UR QL SCN: NEGATIVE
COCAINE UR QL SCN: NEGATIVE
DAT IGG: NEGATIVE
ERYTHROCYTE [DISTWIDTH] IN BLOOD BY AUTOMATED COUNT: 13.4 % (ref 11.5–15)
FENTANYL UR QL: NEGATIVE
HCT VFR BLD AUTO: 34.4 % (ref 34–48)
HGB BLD-MCNC: 11 G/DL (ref 11.5–15.5)
MCH RBC QN AUTO: 27.7 PG (ref 26–35)
MCHC RBC AUTO-ENTMCNC: 32 G/DL (ref 32–34.5)
MCV RBC AUTO: 86.6 FL (ref 80–99.9)
METHADONE UR QL: NEGATIVE
OPIATES UR QL SCN: NEGATIVE
OXYCODONE UR QL SCN: NEGATIVE
PCP UR QL SCN: NEGATIVE
PLATELET # BLD AUTO: 189 K/UL (ref 130–450)
PMV BLD AUTO: 11.3 FL (ref 7–12)
RBC # BLD AUTO: 3.97 M/UL (ref 3.5–5.5)
TEST INFORMATION: NORMAL
WBC OTHER # BLD: 10.8 K/UL (ref 4.5–11.5)

## 2024-10-24 PROCEDURE — 6360000002 HC RX W HCPCS: Performed by: STUDENT IN AN ORGANIZED HEALTH CARE EDUCATION/TRAINING PROGRAM

## 2024-10-24 PROCEDURE — 85027 COMPLETE CBC AUTOMATED: CPT

## 2024-10-24 PROCEDURE — 6370000000 HC RX 637 (ALT 250 FOR IP): Performed by: OBSTETRICS & GYNECOLOGY

## 2024-10-24 PROCEDURE — 80307 DRUG TEST PRSMV CHEM ANLYZR: CPT

## 2024-10-24 PROCEDURE — 99221 1ST HOSP IP/OBS SF/LOW 40: CPT | Performed by: PHYSICIAN ASSISTANT

## 2024-10-24 PROCEDURE — 6360000002 HC RX W HCPCS: Performed by: OBSTETRICS & GYNECOLOGY

## 2024-10-24 PROCEDURE — 3700000025 EPIDURAL BLOCK: Performed by: ANESTHESIOLOGY

## 2024-10-24 PROCEDURE — 86901 BLOOD TYPING SEROLOGIC RH(D): CPT

## 2024-10-24 PROCEDURE — 2500000003 HC RX 250 WO HCPCS: Performed by: NURSE ANESTHETIST, CERTIFIED REGISTERED

## 2024-10-24 PROCEDURE — 86870 RBC ANTIBODY IDENTIFICATION: CPT

## 2024-10-24 PROCEDURE — 86900 BLOOD TYPING SEROLOGIC ABO: CPT

## 2024-10-24 PROCEDURE — 6360000002 HC RX W HCPCS

## 2024-10-24 PROCEDURE — 86850 RBC ANTIBODY SCREEN: CPT

## 2024-10-24 PROCEDURE — 86880 COOMBS TEST DIRECT: CPT

## 2024-10-24 PROCEDURE — 1220000000 HC SEMI PRIVATE OB R&B

## 2024-10-24 PROCEDURE — 2580000003 HC RX 258: Performed by: STUDENT IN AN ORGANIZED HEALTH CARE EDUCATION/TRAINING PROGRAM

## 2024-10-24 PROCEDURE — 7200000001 HC VAGINAL DELIVERY

## 2024-10-24 PROCEDURE — 0KQM0ZZ REPAIR PERINEUM MUSCLE, OPEN APPROACH: ICD-10-PCS | Performed by: OBSTETRICS & GYNECOLOGY

## 2024-10-24 RX ORDER — ACETAMINOPHEN 650 MG
TABLET, EXTENDED RELEASE ORAL
Status: COMPLETED
Start: 2024-10-24 | End: 2024-10-24

## 2024-10-24 RX ORDER — ACETAMINOPHEN 500 MG
1000 TABLET ORAL EVERY 6 HOURS PRN
Status: DISCONTINUED | OUTPATIENT
Start: 2024-10-24 | End: 2024-10-26 | Stop reason: HOSPADM

## 2024-10-24 RX ORDER — ONDANSETRON 2 MG/ML
4 INJECTION INTRAMUSCULAR; INTRAVENOUS EVERY 6 HOURS PRN
Status: DISCONTINUED | OUTPATIENT
Start: 2024-10-24 | End: 2024-10-26 | Stop reason: HOSPADM

## 2024-10-24 RX ORDER — ONDANSETRON 2 MG/ML
4 INJECTION INTRAMUSCULAR; INTRAVENOUS EVERY 6 HOURS PRN
Status: DISCONTINUED | OUTPATIENT
Start: 2024-10-24 | End: 2024-10-24

## 2024-10-24 RX ORDER — SODIUM CHLORIDE, SODIUM LACTATE, POTASSIUM CHLORIDE, AND CALCIUM CHLORIDE .6; .31; .03; .02 G/100ML; G/100ML; G/100ML; G/100ML
500 INJECTION, SOLUTION INTRAVENOUS PRN
Status: DISCONTINUED | OUTPATIENT
Start: 2024-10-24 | End: 2024-10-24

## 2024-10-24 RX ORDER — LIDOCAINE HYDROCHLORIDE 10 MG/ML
INJECTION, SOLUTION INFILTRATION; PERINEURAL
Status: DISCONTINUED
Start: 2024-10-24 | End: 2024-10-24

## 2024-10-24 RX ORDER — IBUPROFEN 600 MG/1
600 TABLET, FILM COATED ORAL EVERY 6 HOURS PRN
Status: DISCONTINUED | OUTPATIENT
Start: 2024-10-24 | End: 2024-10-26 | Stop reason: HOSPADM

## 2024-10-24 RX ORDER — TERBUTALINE SULFATE 1 MG/ML
0.25 INJECTION, SOLUTION SUBCUTANEOUS
Status: DISCONTINUED | OUTPATIENT
Start: 2024-10-24 | End: 2024-10-24

## 2024-10-24 RX ORDER — TRANEXAMIC ACID 10 MG/ML
1000 INJECTION, SOLUTION INTRAVENOUS 3 TIMES DAILY PRN
Status: DISCONTINUED | OUTPATIENT
Start: 2024-10-24 | End: 2024-10-24

## 2024-10-24 RX ORDER — ONDANSETRON 4 MG/1
4 TABLET, ORALLY DISINTEGRATING ORAL EVERY 6 HOURS PRN
Status: DISCONTINUED | OUTPATIENT
Start: 2024-10-24 | End: 2024-10-26 | Stop reason: HOSPADM

## 2024-10-24 RX ORDER — PRENATAL WITH FERROUS FUM AND FOLIC ACID 3080; 920; 120; 400; 22; 1.84; 3; 20; 10; 1; 12; 200; 27; 25; 2 [IU]/1; [IU]/1; MG/1; [IU]/1; MG/1; MG/1; MG/1; MG/1; MG/1; MG/1; UG/1; MG/1; MG/1; MG/1; MG/1
1 TABLET ORAL
Status: DISCONTINUED | OUTPATIENT
Start: 2024-10-25 | End: 2024-10-26 | Stop reason: HOSPADM

## 2024-10-24 RX ORDER — ONDANSETRON 4 MG/1
4 TABLET, ORALLY DISINTEGRATING ORAL EVERY 6 HOURS PRN
Status: DISCONTINUED | OUTPATIENT
Start: 2024-10-24 | End: 2024-10-24

## 2024-10-24 RX ORDER — SODIUM CHLORIDE 0.9 % (FLUSH) 0.9 %
5-40 SYRINGE (ML) INJECTION EVERY 12 HOURS SCHEDULED
Status: DISCONTINUED | OUTPATIENT
Start: 2024-10-24 | End: 2024-10-24

## 2024-10-24 RX ORDER — NALOXONE HYDROCHLORIDE 0.4 MG/ML
INJECTION, SOLUTION INTRAMUSCULAR; INTRAVENOUS; SUBCUTANEOUS PRN
Status: DISCONTINUED | OUTPATIENT
Start: 2024-10-24 | End: 2024-10-24

## 2024-10-24 RX ORDER — FOLIC ACID 1 MG/1
1 TABLET ORAL
Status: DISCONTINUED | OUTPATIENT
Start: 2024-10-25 | End: 2024-10-26 | Stop reason: HOSPADM

## 2024-10-24 RX ORDER — EPHEDRINE SULFATE/0.9% NACL/PF 25 MG/5 ML
10 SYRINGE (ML) INTRAVENOUS
Status: DISCONTINUED | OUTPATIENT
Start: 2024-10-24 | End: 2024-10-24

## 2024-10-24 RX ORDER — SODIUM CHLORIDE, SODIUM LACTATE, POTASSIUM CHLORIDE, CALCIUM CHLORIDE 600; 310; 30; 20 MG/100ML; MG/100ML; MG/100ML; MG/100ML
INJECTION, SOLUTION INTRAVENOUS CONTINUOUS
Status: DISCONTINUED | OUTPATIENT
Start: 2024-10-24 | End: 2024-10-24

## 2024-10-24 RX ORDER — METHYLERGONOVINE MALEATE 0.2 MG/ML
200 INJECTION INTRAVENOUS PRN
Status: DISCONTINUED | OUTPATIENT
Start: 2024-10-24 | End: 2024-10-24

## 2024-10-24 RX ORDER — MISOPROSTOL 200 UG/1
400 TABLET ORAL PRN
Status: DISCONTINUED | OUTPATIENT
Start: 2024-10-24 | End: 2024-10-24

## 2024-10-24 RX ORDER — SODIUM CHLORIDE 0.9 % (FLUSH) 0.9 %
5-40 SYRINGE (ML) INJECTION PRN
Status: DISCONTINUED | OUTPATIENT
Start: 2024-10-24 | End: 2024-10-24

## 2024-10-24 RX ORDER — CARBOPROST TROMETHAMINE 250 UG/ML
250 INJECTION, SOLUTION INTRAMUSCULAR PRN
Status: DISCONTINUED | OUTPATIENT
Start: 2024-10-24 | End: 2024-10-24

## 2024-10-24 RX ORDER — EPHEDRINE SULFATE/0.9% NACL/PF 25 MG/5 ML
5 SYRINGE (ML) INTRAVENOUS PRN
Status: DISCONTINUED | OUTPATIENT
Start: 2024-10-25 | End: 2024-10-24

## 2024-10-24 RX ORDER — DOCUSATE SODIUM 100 MG/1
100 CAPSULE, LIQUID FILLED ORAL 2 TIMES DAILY
Status: DISCONTINUED | OUTPATIENT
Start: 2024-10-24 | End: 2024-10-26 | Stop reason: HOSPADM

## 2024-10-24 RX ORDER — TRANEXAMIC ACID 10 MG/ML
1000 INJECTION, SOLUTION INTRAVENOUS
Status: DISCONTINUED | OUTPATIENT
Start: 2024-10-24 | End: 2024-10-24 | Stop reason: SDUPTHER

## 2024-10-24 RX ORDER — SODIUM CHLORIDE 9 MG/ML
25 INJECTION, SOLUTION INTRAVENOUS PRN
Status: DISCONTINUED | OUTPATIENT
Start: 2024-10-24 | End: 2024-10-24

## 2024-10-24 RX ORDER — TRANEXAMIC ACID 10 MG/ML
1000 INJECTION, SOLUTION INTRAVENOUS
Status: ACTIVE | OUTPATIENT
Start: 2024-10-24 | End: 2024-10-25

## 2024-10-24 RX ORDER — MODIFIED LANOLIN
OINTMENT (GRAM) TOPICAL PRN
Status: DISCONTINUED | OUTPATIENT
Start: 2024-10-24 | End: 2024-10-26 | Stop reason: HOSPADM

## 2024-10-24 RX ADMIN — Medication 166.7 ML: at 15:44

## 2024-10-24 RX ADMIN — Medication: at 20:05

## 2024-10-24 RX ADMIN — IBUPROFEN 600 MG: 600 TABLET ORAL at 22:16

## 2024-10-24 RX ADMIN — Medication 2 MG: at 13:29

## 2024-10-24 RX ADMIN — DOCUSATE SODIUM 100 MG: 100 CAPSULE, LIQUID FILLED ORAL at 20:05

## 2024-10-24 RX ADMIN — Medication 15 ML/HR: at 14:31

## 2024-10-24 RX ADMIN — DESMOPRESSIN ACETATE 20 MCG: 4 INJECTION, SOLUTION INTRAVENOUS; SUBCUTANEOUS at 13:41

## 2024-10-24 RX ADMIN — Medication 87.3 MILLI-UNITS/MIN: at 15:55

## 2024-10-24 RX ADMIN — Medication 4 ML: at 14:29

## 2024-10-24 RX ADMIN — Medication: at 15:30

## 2024-10-24 RX ADMIN — BUTORPHANOL TARTRATE 2 MG: 2 INJECTION, SOLUTION INTRAMUSCULAR; INTRAVENOUS at 13:29

## 2024-10-24 ASSESSMENT — PAIN SCALES - GENERAL
PAINLEVEL_OUTOF10: 4
PAINLEVEL_OUTOF10: 10
PAINLEVEL_OUTOF10: 2

## 2024-10-24 ASSESSMENT — PAIN DESCRIPTION - PAIN TYPE
TYPE: ACUTE PAIN
TYPE: ACUTE PAIN

## 2024-10-24 ASSESSMENT — ENCOUNTER SYMPTOMS
RESPIRATORY NEGATIVE: 1
EYES NEGATIVE: 1

## 2024-10-24 ASSESSMENT — PAIN - FUNCTIONAL ASSESSMENT
PAIN_FUNCTIONAL_ASSESSMENT: ACTIVITIES ARE NOT PREVENTED
PAIN_FUNCTIONAL_ASSESSMENT: ACTIVITIES ARE NOT PREVENTED

## 2024-10-24 ASSESSMENT — PAIN DESCRIPTION - ONSET
ONSET: GRADUAL
ONSET: GRADUAL

## 2024-10-24 ASSESSMENT — PAIN DESCRIPTION - ORIENTATION
ORIENTATION: LOWER
ORIENTATION: LOWER

## 2024-10-24 ASSESSMENT — PAIN DESCRIPTION - DESCRIPTORS
DESCRIPTORS: CRAMPING
DESCRIPTORS: ACHING;DISCOMFORT;SORE
DESCRIPTORS: ACHING;CRAMPING;SORE;DISCOMFORT

## 2024-10-24 ASSESSMENT — PAIN DESCRIPTION - LOCATION
LOCATION: ABDOMEN;PERINEUM
LOCATION: ABDOMEN;PERINEUM
LOCATION: ABDOMEN

## 2024-10-24 ASSESSMENT — PAIN DESCRIPTION - FREQUENCY
FREQUENCY: INTERMITTENT
FREQUENCY: INTERMITTENT

## 2024-10-24 NOTE — PROGRESS NOTES
Pt admitted to 316, oriented to room and unit routine, infant safety discussed, admission papers reviewed, instructed to call for help first time oob, pt had the tdap vaccine during pregnancy

## 2024-10-24 NOTE — PROGRESS NOTES
Patient seen with diaz carlisle  See diaz carlisle's note  Complaining of ctx's  Cx 4cm/50/-2 vtx by my exam  Was 4 cm yesterday  Fht's 130-140 with accels  Irregular ctx's  Von willebrand disease  Orders per dr benavides

## 2024-10-24 NOTE — ANESTHESIA PROCEDURE NOTES
Epidural Block    Patient location during procedure: OB  Reason for block: labor epidural  Staffing  Performed: resident/CRNA   Anesthesiologist: Susie Guzmán DO  Resident/CRNA: Krunal Nielsen APRN - CRNA  Other anesthesia staff: Almas Osborn RN  Performed by: Krunal Nielsen APRN - CRNA  Authorized by: Susie Guzmán DO    Epidural  Patient position: sitting  Prep: ChloraPrep  Patient monitoring: cardiac monitor, continuous pulse ox and frequent blood pressure checks  Approach: midline  Location: L3-4  Injection technique: LILLIAM saline  Provider prep: mask and sterile gloves  Needle  Needle type: Tuohy   Needle gauge: 18 G  Needle length: 3.5 in  Needle insertion depth: 5 cm  Catheter type: multi-orifice  Catheter size: 20 G.  Catheter at skin depth: 15 cm  Test dose: negativeCatheter Secured: tegaderm and tape  Assessment  Sensory level: T10  Hemodynamics: stable  Attempts: 1  Outcomes: uncomplicated and patient tolerated procedure well  Additional Notes  DDAVP was completed 35 mins before placement  Preanesthetic Checklist  Completed: patient identified, IV checked, site marked, risks and benefits discussed, surgical/procedural consents, equipment checked, pre-op evaluation, timeout performed, anesthesia consent given, oxygen available, monitors applied/VS acknowledged, fire risk safety assessment completed and verbalized and blood product R/B/A discussed and consented

## 2024-10-24 NOTE — L&D DELIVERY NOTE
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, passive ab and Anti-IH), history of spontaneous  x 2, BRCA2 gene mutation positive   Patient Active Problem List   Diagnosis    Rh negative state in antepartum period    Von Willebrand disease (Consult hematology on arrival - needs DDAVP prior to Epidural; did not need DDAVP or TXA PP G1)    History of kidney stones    BRCA gene positive    Anxiety    Narcolepsy without cataplexy    Heterozygous MTHFR mutation [id number]    JHON-1 4G/5G genotype    Fourth pregnancy    Hx of transfusion (x 6, with tonsillectomy and menses)    Thrombophilia (mild complex)    Hx of  delivery, currently pregnant, third trimester (G2 at 35w6d)    37 4/7 weeks gestation of pregnancy    Thrombophilia affecting pregnancy in third trimester, antepartum ([facility])    Red blood cell antibody positive     Post-delivery Diagnosis:  Living  infant Female, nuchal cord x 2, second degree perineal/vaginal laveration  Patient Active Problem List   Diagnosis    Rh negative state in antepartum period    Von Willebrand disease (Consult hematology on arrival - needs DDAVP prior to Epidural; did not need DDAVP or TXA PP G1)    History of kidney stones    BRCA gene positive    Anxiety    Narcolepsy without cataplexy    Heterozygous MTHFR mutation [id number]    JHON-1 4G/5G genotype    Fourth pregnancy

## 2024-10-24 NOTE — ANESTHESIA PRE PROCEDURE
Department of Anesthesiology  Preprocedure Note       Name:  Farhana Hughes   Age:  29 y.o.  :  1995                                          MRN:  [id number]         Date:  10/24/2024      Surgeon: * No surgeons listed *    Procedure: * No procedures listed *    Medications prior to admission:   Prior to Admission medications    Medication Sig Start Date End Date Taking? Authorizing Provider   folic acid (FOLVITE) 1 MG tablet Take 1 tablet by mouth daily   Yes ProviderMadelin MD Prenat-FeChel-FA-DHA w/o Vit A (PRENA 1 TRUE) 30-1.4 & 300 MG MISC Take by mouth   Yes ProviderMadelin MD   nitrofurantoin, macrocrystal-monohydrate, (MACROBID) 100 MG capsule Take 1 capsule by mouth every 12 hours for 13 doses  Patient not taking: Reported on 10/24/2024 10/21/24 10/28/24  Aline Tobias, APRN - CNM       Current medications:    Current Facility-Administered Medications   Medication Dose Route Frequency Provider Last Rate Last Admin    lactated ringers IV soln infusion SOLN   IntraVENous Continuous Berto Alberts MD        lactated ringers bolus 500 mL  500 mL IntraVENous PRN Berto Alberts MD        Or    lactated ringers bolus 500 mL  500 mL IntraVENous PRN Berto Alberts MD        sodium chloride flush 0.9 % injection 5-40 mL  5-40 mL IntraVENous 2 times per day Berto Alberts MD        sodium chloride flush 0.9 % injection 5-40 mL  5-40 mL IntraVENous PRN Berto Alberts MD        0.9 % sodium chloride infusion  25 mL IntraVENous PRN Berto Alberts MD        methylergonovine (METHERGINE) injection 200 mcg  200 mcg IntraMUSCular PRBerto Maloney MD        carboprost (HEMABATE) injection 250 mcg  250 mcg IntraMUSCular Berto Stallings MD        miSOPROStol (CYTOTEC) tablet 400 mcg  400 mcg Buccal Berto Stallings MD        oxytocin (PITOCIN) 15 units in 250 mL infusion  87.3 michelle-units/min IntraVENous Continuous PRN Koulianos,

## 2024-10-24 NOTE — H&P
Department of Obstetrics and Gynecology  Labor and Delivery   Physician Assistant Obstetrics History and Physical      Patient Name: Farhana Hughes  YOB: 1995   MRN: [id number]    CHIEF COMPLAINT:  contractions    HISTORY OF PRESENT ILLNESS:    The patient is a 29 y.o. female, , Estimated Date of Delivery: 11/10/24, EGA: 37 and 4/7 weeks presents to L&D Antepartum from home for c/o contractions since midnight worsening at 06:30.  Feeling approximately  6 contractions an hour prior to arrival, states now have spaced some. Cervix was 4/70/-1 yesterday at office. Patient denies leakage of fluid, vaginal bleeding, swelling, RUQ or epigastric pain, headache, visual changes, or urinary symptoms.  Perceived fetal movement is good.    Her current obstetrical history is significant for:   Complex thrombophilia- JHON-1 and Von Willebrand disease type 2  History of PTD at 35w6d   O negative     PAST OB HISTORY  OB History    Para Term  AB Living   4 1   1 2 1   SAB IAB Ectopic Molar Multiple Live Births   2         1      # Outcome Date GA Lbr Kilo/2nd Weight Sex Type Anes PTL Lv   4 Current            3 SAB 24 5w0d          2  22 35w6d  2.665 kg (5 lb 14 oz) M Vag-Spont EPI Y LORI      Complications:  Labor   1 SAB 2021 6w0d       ND       Past Medical History:    Diagnosis Date    Anxiety 2021    BRCA2 gene mutation positive 2020    Breast disorder 10/2022    Results were benign after a mass was found. Follow up every 6 months for BRCA2 gene with high risk breast specialist    GERD (gastroesophageal reflux disease)     History of blood transfusion     tranfused x 6 for von willebrands    IBS (irritable bowel syndrome)     Kidney stone 2016    Postpartum depression 2022     delivery 22    35 weeks pregnant     labor 22    35 weeks 6 days    Psychiatric problem     took effexor and lexapro in past for anxiety    Rh incompatibility  PLAN:  29 y.o. female,  at 37 and 4/7 weeks of gestation.  R/o labor    Cervix: /-2  Contractions: irregular      Discussed with Dr. Howell. Orders per him:  EFM  Observation  Oral hydration  Recheck cervix in 2 hours      Electronically signed by Kavita Brownlee PA-C on 10/24/2024 at 8:39 AM

## 2024-10-24 NOTE — CONSULTS
Kaci Muro MD   ·   MD Casandra Hassan APRN  ·  EDWIN Shafer        Inpatient Medical Oncology/Hematology Consult Note            Patient Name: Farhana Hughes  YOB: 1995    DATE OF ADMISSION: 10/24/2024  DATE OF CONSULTATION: 10/24/2024   CONSULTING PROVIDER: Dr. Soliman   REASON FOR CONSULTATION: DDAVP order   PCP: Rolando Wu    Room: [address]      CHIEF COMPLAINT:  Labor pain     HISTORY OF PRESENT ILLNESS   Patient is a 29 y.o. female 37w4d admitted in active labor . PMH of vWD, type 2N , BRCA2 gene mutation positive in female - mammogram vs MRI every 6 months, annual pelvic ultrasounds . As  child frequent epistaxis age 9 tonsillectomy with severe bleeding,hospitalized with menorrhagia , at that time official diagnosis of VWD made.  Treated w/ on demand Stimate & Tranexamic Acid prior to surgical procedures . Presented to the hospital after having regular contractions at home . Our service has been consulted for DDAVP order and patient's von Willebrand status.   Patient seen in room  is present she is having active contractions during my exam she denies any bleeding per nursing no bleeding at this time.    Hematology hx :  The patient has had 4 pregnancies. In 2021 her first pregnancy resulted in a miscarriage at 6 weeks. Her second pregnancy resulted in delivery of a healthy baby boy ( uncomplicated vaginal delivery, s/p DDAV prior). She was not on ASA at the time. Two months ago her third pregnancy resulted in a miscarriage at 6 wks. The patient is currently pregnant at 7 weeks gestation. She had minimal vaginal spotting that resolved yesterday. She is followed by OB and has an appointment w/ the high risk clinic in April. Latest HCG on 3/12/24 measured at 3064 ( up from 180.2 on 3/4/24).   Baseline testing done 9/27/23 showed F VIII activity 37%, vWF Ag 48%, vWF activity 30.   Due to miscarriages x 2 she was ordered a thrombophilia work-up

## 2024-10-24 NOTE — PROGRESS NOTES
presents to unit at 37w4d with c/o contractions beginning at 0000 and worsening around 0630. Pt was previously 4cm in the office. Denies lof. Reports some spotting. Perceives fetal movement. DANIEL: 11/10/24. Pt sees MFM for von willebrand disease and other thrombophilias. Placed on efm. VSS. Call light within reach

## 2024-10-24 NOTE — PROGRESS NOTES
Patient actively pushing. Dr Alberts and RN remains in continuous attendance at the bedside. Assessment & evaluation of fetal heart rate, ongoing via continuous EFM.

## 2024-10-25 PROBLEM — O41.8X20 SUBCHORIONIC HEMATOMA IN SECOND TRIMESTER: Status: RESOLVED | Noted: 2024-06-26 | Resolved: 2024-10-25

## 2024-10-25 PROBLEM — O46.8X2 SUBCHORIONIC HEMATOMA IN SECOND TRIMESTER: Status: RESOLVED | Noted: 2024-06-26 | Resolved: 2024-10-25

## 2024-10-25 PROBLEM — O36.0930: Status: RESOLVED | Noted: 2024-05-16 | Resolved: 2024-10-25

## 2024-10-25 LAB — RESULT: NEGATIVE

## 2024-10-25 PROCEDURE — 85461 HEMOGLOBIN FETAL: CPT

## 2024-10-25 PROCEDURE — 85246 CLOT FACTOR VIII VW ANTIGEN: CPT

## 2024-10-25 PROCEDURE — 6370000000 HC RX 637 (ALT 250 FOR IP): Performed by: OBSTETRICS & GYNECOLOGY

## 2024-10-25 PROCEDURE — 85240 CLOT FACTOR VIII AHG 1 STAGE: CPT

## 2024-10-25 PROCEDURE — 85245 CLOT FACTOR VIII VW RISTOCTN: CPT

## 2024-10-25 PROCEDURE — 6360000002 HC RX W HCPCS: Performed by: OBSTETRICS & GYNECOLOGY

## 2024-10-25 PROCEDURE — 1220000000 HC SEMI PRIVATE OB R&B

## 2024-10-25 RX ORDER — PSEUDOEPHEDRINE HCL 30 MG
100 TABLET ORAL 2 TIMES DAILY PRN
COMMUNITY
Start: 2024-10-25

## 2024-10-25 RX ORDER — ACETAMINOPHEN 500 MG
1000 TABLET ORAL EVERY 6 HOURS PRN
COMMUNITY
Start: 2024-10-25

## 2024-10-25 RX ORDER — IBUPROFEN 600 MG/1
600 TABLET, FILM COATED ORAL EVERY 6 HOURS PRN
Qty: 30 TABLET | Refills: 0 | Status: SHIPPED | OUTPATIENT
Start: 2024-10-25

## 2024-10-25 RX ORDER — MODIFIED LANOLIN
1 OINTMENT (GRAM) TOPICAL PRN
COMMUNITY
Start: 2024-10-25

## 2024-10-25 RX ADMIN — ACETAMINOPHEN 1000 MG: 500 TABLET ORAL at 15:10

## 2024-10-25 RX ADMIN — DOCUSATE SODIUM 100 MG: 100 CAPSULE, LIQUID FILLED ORAL at 08:35

## 2024-10-25 RX ADMIN — HUMAN RHO(D) IMMUNE GLOBULIN 300 MCG: 1500 SOLUTION INTRAMUSCULAR; INTRAVENOUS at 13:09

## 2024-10-25 RX ADMIN — FOLIC ACID 1 MG: 1 TABLET ORAL at 08:35

## 2024-10-25 RX ADMIN — IBUPROFEN 600 MG: 600 TABLET ORAL at 18:16

## 2024-10-25 RX ADMIN — DOCUSATE SODIUM 100 MG: 100 CAPSULE, LIQUID FILLED ORAL at 20:19

## 2024-10-25 RX ADMIN — ACETAMINOPHEN 1000 MG: 500 TABLET ORAL at 21:13

## 2024-10-25 RX ADMIN — PRENATAL WITH FERROUS FUM AND FOLIC ACID 1 TABLET: 3080; 920; 120; 400; 22; 1.84; 3; 20; 10; 1; 12; 200; 27; 25; 2 TABLET ORAL at 08:35

## 2024-10-25 RX ADMIN — ACETAMINOPHEN 1000 MG: 500 TABLET ORAL at 02:43

## 2024-10-25 RX ADMIN — IBUPROFEN 600 MG: 600 TABLET ORAL at 04:55

## 2024-10-25 RX ADMIN — IBUPROFEN 600 MG: 600 TABLET ORAL at 11:20

## 2024-10-25 RX ADMIN — ACETAMINOPHEN 1000 MG: 500 TABLET ORAL at 08:43

## 2024-10-25 ASSESSMENT — PAIN DESCRIPTION - DESCRIPTORS
DESCRIPTORS: CRAMPING
DESCRIPTORS: ACHING;DISCOMFORT;CRAMPING;SORE
DESCRIPTORS: ACHING;DISCOMFORT;CRAMPING;SORE
DESCRIPTORS: ACHING;CRAMPING;DISCOMFORT;SORE
DESCRIPTORS: CRAMPING
DESCRIPTORS: ACHING;CRAMPING;DISCOMFORT;SORE
DESCRIPTORS: ACHING;CRAMPING;DISCOMFORT;SORE
DESCRIPTORS: CRAMPING
DESCRIPTORS: ACHING;DISCOMFORT;CRAMPING;SORE
DESCRIPTORS: CRAMPING

## 2024-10-25 ASSESSMENT — PAIN DESCRIPTION - LOCATION
LOCATION: ABDOMEN;PERINEUM
LOCATION: ABDOMEN
LOCATION: ABDOMEN
LOCATION: ABDOMEN;PERINEUM
LOCATION: PERINEUM;ABDOMEN
LOCATION: ABDOMEN
LOCATION: ABDOMEN
LOCATION: ABDOMEN;PERINEUM

## 2024-10-25 ASSESSMENT — PAIN DESCRIPTION - PAIN TYPE
TYPE: ACUTE PAIN

## 2024-10-25 ASSESSMENT — PAIN - FUNCTIONAL ASSESSMENT
PAIN_FUNCTIONAL_ASSESSMENT: ACTIVITIES ARE NOT PREVENTED

## 2024-10-25 ASSESSMENT — PAIN DESCRIPTION - FREQUENCY
FREQUENCY: INTERMITTENT

## 2024-10-25 ASSESSMENT — PAIN DESCRIPTION - ORIENTATION
ORIENTATION: LOWER

## 2024-10-25 ASSESSMENT — PAIN SCALES - GENERAL
PAINLEVEL_OUTOF10: 2
PAINLEVEL_OUTOF10: 5
PAINLEVEL_OUTOF10: 2
PAINLEVEL_OUTOF10: 5
PAINLEVEL_OUTOF10: 5
PAINLEVEL_OUTOF10: 6
PAINLEVEL_OUTOF10: 4
PAINLEVEL_OUTOF10: 4
PAINLEVEL_OUTOF10: 2
PAINLEVEL_OUTOF10: 4

## 2024-10-25 ASSESSMENT — PAIN DESCRIPTION - ONSET
ONSET: GRADUAL

## 2024-10-25 NOTE — DISCHARGE INSTR - DIET

## 2024-10-25 NOTE — LACTATION NOTE
Encouraged skin to skin and frequent attempts at breast to stimulate milk production. Instructed on normal infant behavior in the first 12-24 hours and importance of stimulating the baby frequently to eat during this time. Reviewed hand expression, and encouraged to hand express drops of colostrum when baby is sleepy. Instructed that baby may also feed 8-12 times a day- cluster feeding at times- as her milk supply is being established. Educated on making sure infant has an open airway while breastfeeding and skin to skin. Instructed on hunger cues and waking techniques to try. Reviewed signs of adequate I & O; allow baby to feed ad héctor and not to limit time at breast. Breastfeeding booklet provided with review of its contents. Encouraged to call with any concerns.

## 2024-10-25 NOTE — PROGRESS NOTES
Name: Farhana Hughes                Buddhist: Gnosticist   Referral: [facility] [place]      Assessment:  Parent(s) were receptive to  visit and baby blessing.        Intervention:   provided blessing for new born and parent(s) and distributed prayer card for family. Patient shared...     Outcome:  Parent/S appreciated blessing for child.     Plan:  Chaplains will remain available to offer spiritual and emotional support as needed.       Electronically signed by SRINI Acosta, on 10/25/2024 at 2:51 PM.  [facility]  [facility]  [phone]

## 2024-10-25 NOTE — PROGRESS NOTES
PPD #1    Patient:  Farhana Hughes     Admit Date:  10/24/2024  8:16 AM  Medical Record Number:  [id number]   Today's Date: 10/25/2024    S: No complaints, doing well, would like a Day#1 discharge however baby will not be discharged home until tomorrow morning; tolerating diet: yes - general; ambulating well: yes - up in room and halls; voiding without difficulty:  yes - has no urinary complaints; bm: yes - had her second one last night; flatus: yes - normal; pain meds appropriate: yes - Tylenol and ibuprofen 600 mg; vaginal bleeding: less than a period.    O:   Vitals:    10/24/24 1837 10/24/24 2304 10/25/24 0703 10/25/24 0830   BP: (!) 112/55 (!) 110/57 104/71    Pulse: 77 80 73    Resp: 16 16 18    Temp: 98.2 °F (36.8 °C) 98.1 °F (36.7 °C) 97.1 °F (36.2 °C)    TempSrc: Oral Oral Infrared    SpO2:  98%  99%     Gen: A&O, cooperative, pleasant   Heart: RRR   Lungs: CTA b/l   Abd; soft, NT, non distended, +BS  Back: no CVA or paraspinal tenderness   Ext: No clubbing, cyanosis, edema:1+ , no cords palpable, no calf tenderness   Neuro: intact   Uterus: firm, well contracted, nt   Perineum: intact, healing well   Lynnette pad: staining only, thin lochia    Lab Results   Component Value Date    HGB 11.0 (L) 10/24/2024    HCT 34.4 10/24/2024      Recent Results (from the past 72 hour(s))   CBC    Collection Time: 10/24/24 10:50 AM   Result Value Ref Range    WBC 10.8 4.5 - 11.5 k/uL    RBC 3.97 3.50 - 5.50 m/uL    Hemoglobin 11.0 (L) 11.5 - 15.5 g/dL    Hematocrit 34.4 34.0 - 48.0 %    MCV 86.6 80.0 - 99.9 fL    MCH 27.7 26.0 - 35.0 pg    MCHC 32.0 32.0 - 34.5 g/dL    RDW 13.4 11.5 - 15.0 %    Platelets 189 130 - 450 k/uL    MPV 11.3 7.0 - 12.0 fL   TYPE AND SCREEN    Collection Time: 10/24/24 10:50 AM   Result Value Ref Range    Blood Bank Sample Expiration 10/27/2024,7479     Arm Band Number [id number]     ABO/Rh O NEGATIVE     Antibody Screen POSITIVE     Antibody ID       Anti-D, Passive Due To RhIG  OTHER ALLOANTIBODIES  Narcolepsy without cataplexy    Heterozygous MTHFR mutation [id number]    JHON-1 4G/5G genotype    Fourth pregnancy    Hx of transfusion (x 6, with tonsillectomy and menses)    Thrombophilia (mild complex)    Hx of  delivery, currently pregnant, third trimester (G2 at 35w6d)    37 4/7 weeks gestation of pregnancy    Thrombophilia affecting pregnancy in third trimester, antepartum (HCC)    Red blood cell antibody positive     (normal spontaneous vaginal delivery)    Term birth of  female    Double nuchal cord    Second degree laceration of perineum, delivered, current hospitalization       P: Routine PP care.   Discharge home with instructions, precautions tomorrow morning.  Prescription for Ibuprofen 600 mg. May use over the counter Tylenol as needed.  Continue PNV with Iron daily.  Follow-up office 6 weeks for postpartum visit.    Berto Alberts MD FACOG  10/25/2024 10:51 AM

## 2024-10-25 NOTE — DISCHARGE INSTR - ACTIVITY
After Your Delivery Discharge Instructions    What to do after you leave the hospital:    Recommended diet: regular diet  Recommended activity: No driving for 1 weeks, no sex or tampon use for 6 weeks. No douching.  No heavy lifting (greater than baby and carrier) for 6 weeks.  Initially, avoid frequent ambulation with stairs - start slowly then increase as tolerated.  You may return to work in 6 weeks.  Showers are fine - avoid bath tubs, hot tubs, swimming.  Follow-up in 6 weeks for final postpartum visit.  Continue the prenatal vitamins with iron over the next 6 weeks minimum.  If you are breast-feeding, continue them until you are no longer nursing.    Call the Physician with any of these signs and symptoms:    Warning signs regarding stitches:  \"Popping\" of stitches  Foul smelling discharge or pus  More redness or streaks around stitches than before    Perineum / episiotomy care:  Continue care as in the hospital (sitz baths, squirt bottle, etc.)  No tub baths until OK'd by your Physician , showers are fine     After your delivery - signs and symptoms to watch for:  Fever - Oral temperature greater than 100.4 degrees Fahrenheit  Foul-smelling vaginal discharge  Headache unrelieved by \"pain meds\"  Difficulty urinating  Breasts reddened, hard, hot to the touch  Nipple discharge which is foul-smelling or contains pus  Increased pain at the site of the laceration repair  Difficulty breathing with or without chest pain  New calf pain especially if only on one side  Sudden, continuing increased vaginal bleeding (heavier than a period) with or without clots  Unrelieved feelings of:  Inability to cope  Sadness  Anxiety  Lack of interest in baby  Insomnia  Crying     What to do at home:  Resume activity gradually   Don't lift anything heavier than baby and carrier until OK'd by your Physician   No sex until OK'd by your Physician  Eat regular nutritious meals  Take pain medication as prescribed whenever you need

## 2024-10-25 NOTE — PROGRESS NOTES
[employer] [facility] screening results were discussed with parent. Questions answered. Brochure given to parent. Advised to monitor developmental milestones and contact physician for any concerns.   Onofre Griffith

## 2024-10-25 NOTE — ANESTHESIA POSTPROCEDURE EVALUATION
Department of Anesthesiology  Postprocedure Note    Patient: Farhana Hughes  MRN: [id number]  YOB: 1995  Date of evaluation: 10/25/2024    Procedure Summary       Date: 10/24/24 Room / Location:     Anesthesia Start: 1415 Anesthesia Stop: 1537    Procedure: Labor Analgesia Diagnosis:     Scheduled Providers:  Responsible Provider:     Anesthesia Type: epidural, spinal, general ASA Status: 3            Anesthesia Type: No value filed.    Debbie Phase I:      Debbie Phase II:      Anesthesia Post Evaluation    Patient location during evaluation: bedside  Patient participation: complete - patient participated  Level of consciousness: awake and alert  Pain score: 0  Airway patency: patent  Nausea & Vomiting: no nausea and no vomiting  Cardiovascular status: hemodynamically stable and blood pressure returned to baseline  Respiratory status: acceptable  Hydration status: euvolemic  Comments: Patient satisfied with epidural for labor  Pain management: adequate        No notable events documented.

## 2024-10-25 NOTE — DISCHARGE SUMMARY
Department of Obstetrics and Gynecology  Labor and Delivery  Discharge Summary    Patient:  Farhana Hughes         Medical Record Number:  [id number]    Admit Date:  10/24/2024  8:16 AM    Discharge Date: 10/26/2024    Final Diagnosis:   Principal Problem:    37 4/7 weeks gestation of pregnancy  Active Problems:    Von Willebrand disease (Consult hematology on arrival - needs DDAVP prior to Epidural; did not need DDAVP or TXA PP G1)    Fourth pregnancy    Thrombophilia affecting pregnancy in third trimester, antepartum (HCC)     (normal spontaneous vaginal delivery)    Term birth of  female    Double nuchal cord    Second degree laceration of perineum, delivered, current hospitalization    Rh negative state in antepartum period    BRCA gene positive    Anxiety    Heterozygous MTHFR mutation [id number]    JHON-1 4G/5G genotype    Hx of transfusion (x 6, with tonsillectomy and menses)    Thrombophilia (mild complex)    Hx of  delivery, currently pregnant, third trimester (G2 at 35w6d)    Red blood cell antibody positive  Resolved Problems:    * No resolved hospital problems. *      Chief Complaint - Presenting Illness - Physical Findings:   37 weeks gestation of pregnancy [Z3A.37]  HPI: The patient is a 29 y.o. female, , Estimated Date of Delivery: 11/10/24, EGA: 37 and 4/7 weeks presents to L&D Antepartum from home for c/o contractions since midnight worsening at 06:30.  Feeling approximately  6 contractions an hour prior to arrival, states now have spaced some. Cervix was 4/70/-1 yesterday at office. Patient denies leakage of fluid, vaginal bleeding, swelling, RUQ or epigastric pain, headache, visual changes, or urinary symptoms.  Perceived fetal movement is good.     Her current obstetrical history is significant for:   Complex thrombophilia- JHON-1 and Von Willebrand disease type 2  History of PTD at 35w6d   O negative     She was still 4 cm on admission but progressively uncomfortable.  During

## 2024-10-25 NOTE — PROGRESS NOTES
Resting comfortably in bed. Heplock intact.Mepilex dressing intact. Dime size old drainage noted on left side.Encouraged smi and ambulation

## 2024-10-25 NOTE — PLAN OF CARE
Problem: Discharge Planning  Goal: Discharge to home or other facility with appropriate resources  Outcome: Progressing     Problem: Pain  Goal: Verbalizes/displays adequate comfort level or baseline comfort level  Outcome: Progressing       Problem: Postpartum  Goal: Experiences normal postpartum course  Description:  Postpartum OB-Pregnancy care plan goal which identifies if the mother is experiencing a normal postpartum course  Outcome: Progressing  Goal: Appropriate maternal -  bonding  Description:  Postpartum OB-Pregnancy care plan goal which identifies if the mother and  are bonding appropriately  Outcome: Progressing  Goal: Establishment of infant feeding pattern  Description:  Postpartum OB-Pregnancy care plan goal which identifies if the mother is establishing a feeding pattern with their   Outcome: Progressing  Goal: Incisions, wounds, or drain sites healing without S/S of infection  Outcome: Progressing     Problem: Safety - Adult  Goal: Free from fall injury  Outcome: Progressing

## 2024-10-26 VITALS
RESPIRATION RATE: 16 BRPM | TEMPERATURE: 97.1 F | DIASTOLIC BLOOD PRESSURE: 69 MMHG | SYSTOLIC BLOOD PRESSURE: 109 MMHG | OXYGEN SATURATION: 98 % | HEART RATE: 87 BPM

## 2024-10-26 PROBLEM — Z87.442 HISTORY OF KIDNEY STONES: Status: RESOLVED | Noted: 2021-12-08 | Resolved: 2024-10-26

## 2024-10-26 PROBLEM — Z3A.37 37 WEEKS GESTATION OF PREGNANCY: Status: RESOLVED | Noted: 2024-10-20 | Resolved: 2024-10-26

## 2024-10-26 PROBLEM — G47.419 NARCOLEPSY WITHOUT CATAPLEXY: Status: RESOLVED | Noted: 2023-01-31 | Resolved: 2024-10-26

## 2024-10-26 PROBLEM — Z34.90 FOURTH PREGNANCY: Status: RESOLVED | Noted: 2024-05-16 | Resolved: 2024-10-26

## 2024-10-26 PROCEDURE — 6370000000 HC RX 637 (ALT 250 FOR IP): Performed by: OBSTETRICS & GYNECOLOGY

## 2024-10-26 RX ADMIN — FOLIC ACID 1 MG: 1 TABLET ORAL at 09:09

## 2024-10-26 RX ADMIN — DOCUSATE SODIUM 100 MG: 100 CAPSULE, LIQUID FILLED ORAL at 09:09

## 2024-10-26 RX ADMIN — IBUPROFEN 600 MG: 600 TABLET ORAL at 05:34

## 2024-10-26 RX ADMIN — PRENATAL WITH FERROUS FUM AND FOLIC ACID 1 TABLET: 3080; 920; 120; 400; 22; 1.84; 3; 20; 10; 1; 12; 200; 27; 25; 2 TABLET ORAL at 09:09

## 2024-10-26 RX ADMIN — ACETAMINOPHEN 1000 MG: 500 TABLET ORAL at 09:09

## 2024-10-26 ASSESSMENT — PAIN DESCRIPTION - ORIENTATION
ORIENTATION: LOWER

## 2024-10-26 ASSESSMENT — PAIN - FUNCTIONAL ASSESSMENT
PAIN_FUNCTIONAL_ASSESSMENT: ACTIVITIES ARE NOT PREVENTED

## 2024-10-26 ASSESSMENT — PAIN DESCRIPTION - FREQUENCY
FREQUENCY: INTERMITTENT

## 2024-10-26 ASSESSMENT — PAIN DESCRIPTION - PAIN TYPE
TYPE: ACUTE PAIN

## 2024-10-26 ASSESSMENT — PAIN DESCRIPTION - DESCRIPTORS
DESCRIPTORS: ACHING;CRAMPING;DISCOMFORT
DESCRIPTORS: ACHING;DISCOMFORT;SORE
DESCRIPTORS: ACHING;DISCOMFORT;SORE

## 2024-10-26 ASSESSMENT — PAIN DESCRIPTION - ONSET
ONSET: GRADUAL

## 2024-10-26 ASSESSMENT — PAIN DESCRIPTION - LOCATION
LOCATION: ABDOMEN;PERINEUM
LOCATION: ABDOMEN;PERINEUM
LOCATION: ABDOMEN

## 2024-10-26 ASSESSMENT — PAIN SCALES - GENERAL
PAINLEVEL_OUTOF10: 2
PAINLEVEL_OUTOF10: 2
PAINLEVEL_OUTOF10: 5
PAINLEVEL_OUTOF10: 4

## 2024-10-26 NOTE — PROGRESS NOTES
PPD #2     Patient:  Farhana Hughes     Admit Date:  10/24/2024  8:16 AM  Medical Record Number:  [id number]   Today's Date: 10/26/2024    S: No complaints; tolerating diet: affirms ; ambulating well: affirms; voiding without difficulty:  affirms; bm: no; flatus: affirms; pain meds appropriate: affirms; vaginal bleeding: thin lochia.    O:   Vitals:    10/25/24 0830 10/25/24 1507 10/25/24 2313 10/26/24 0655   BP:  121/73 (!) 94/53 109/69   Pulse:  87 88 67   Resp:  16 16 16   Temp:  97.1 °F (36.2 °C) 98.2 °F (36.8 °C) 97.1 °F (36.2 °C)   TempSrc:  Infrared Oral Infrared   SpO2: 99%  98%      Gen: A&O, cooperative, pleasant   Heart: RRR   Lungs: CTA b/l   Abd; soft, NT, non distended, +BS  Back: no CVA or paraspinal tenderness   Ext: No clubbing, cyanosis, edema:no , no cords palpable, no calf tenderness   Neuro: intact   Uterus: firm, well contracted, nt   Perineum: intact, healing well   Lynnette pad: staining only    Lab Results   Component Value Date    HGB 11.0 (L) 10/24/2024    HCT 34.4 10/24/2024      Recent Results (from the past 72 hour(s))   CBC    Collection Time: 10/24/24 10:50 AM   Result Value Ref Range    WBC 10.8 4.5 - 11.5 k/uL    RBC 3.97 3.50 - 5.50 m/uL    Hemoglobin 11.0 (L) 11.5 - 15.5 g/dL    Hematocrit 34.4 34.0 - 48.0 %    MCV 86.6 80.0 - 99.9 fL    MCH 27.7 26.0 - 35.0 pg    MCHC 32.0 32.0 - 34.5 g/dL    RDW 13.4 11.5 - 15.0 %    Platelets 189 130 - 450 k/uL    MPV 11.3 7.0 - 12.0 fL   TYPE AND SCREEN    Collection Time: 10/24/24 10:50 AM   Result Value Ref Range    Blood Bank Sample Expiration 10/27/2024,2359     Arm Band Number [id number]     ABO/Rh O NEGATIVE     Antibody Screen POSITIVE     Antibody ID       Anti-D, Passive Due To RhIG  OTHER ALLOANTIBODIES RULED OUT      JOSSELIN IgG NEGATIVE     Blood Bank Comment       Delay for compatible blood.This patient is currently demonstrating or has a history of irregular antibodies. Allow extra time for crossmatch compatible blood.  DELAY CALLED TO

## 2024-10-26 NOTE — PROGRESS NOTES
Mother instructed on infant's discharge instructions with verbalized understanding. Questions answered prn.    Mother given a copy of infant's discharge instructions for her records. Final ID band check completed with mother and infant. Correct ID confirmed. Hugs tag disabled and removed.    Patient instructed on discharge instructions with verbalized understanding. Questions answered prn.Patient given a copy of her discharge instructions for her records.

## 2024-10-26 NOTE — DISCHARGE INSTRUCTIONS
Follow-up with your OB doctor in 1 week if  delivery or in 6 weeks for vaginal delivery unless otherwise instructed.   Call office for an appointment.    For breastfeeding support, you can contact our lactation specialists at [phone] or [phone]    DIET  Eat a well balanced diet focusing on foods high in fiber and protein  Drink plenty of fluids especially water.  To avoid constipation you may take a mild stool softener as recommended by your doctor or midwife.    ACTIVITY  Gradually increase your activity.  Resume exercise regimen only after advised by your doctor or midwife.  Avoid lifting anything heavier than your baby or a gallon of milk for SIX weeks.   Avoid driving until your doctor or midwife has given their approval.  Rise slowly from a lying to sitting and then a standing position.  Climb stairs one at a time.  Use caution when carrying your baby up and down the stairs.  No sexual activity for 6 weeks or until advised by your doctor - Nothing in vagina: intercourse, tampons, or douching.   Be prepared to discuss family planning at your follow-up OB visit.   You may feel tired or have a lack of energy.  You may continue your prenatal vitamin to replenish nutrients post delivery.  Nap when baby naps to catch up on sleep.  May return to work or school in 6 weeks or as directed by OB.     EMOTIONS  You may feed palomo, sad, teary, & overwhelmed.  Contact your OB provider if you feel you may be showing signs of postpartum depression, or have thoughts of harming yourself or your infant.  If infant will not stop crying, contact another adult for help or place infant in their crib on their back and take a break.  NEVER shake your infant.      BLEEDING  Vaginal bleeding will decrease in amount over the next few weeks.  You will notice that as your activity increases, your flow may increase.  This is your body's way of telling you, you need to take things easier and rest more often.  Call your  weakness or dizziness.   If you are having flu-like symptoms such as achy muscles or joints.  There is a foul smell or a green color to your vaginal bleeding.  If you have pain that cannot be relieved.  You have persistent burning with urination or frequency.   Call if you have concerns about your well-being.  You are unable to sleep, eat, or are having thoughts of harming yourself or your baby.   You have swelling, bleeding, drainage, foul odor, redness, or warmth in/around your incision or stitches.  You have a red, warm, tender area in you calf.

## 2024-10-28 LAB
VWF AG ACT/NOR PPP IA: 200 % (ref 52–214)
VWF:RCO ACT/NOR PPP PL AGG: 221 % (ref 51–215)

## 2024-10-30 LAB — FACTOR VIII ACTIVITY: 102 % (ref 50–150)

## 2024-11-08 ENCOUNTER — OFFICE VISIT (OUTPATIENT)
Dept: PRIMARY CARE CLINIC | Age: 29
End: 2024-11-08

## 2024-11-08 VITALS
BODY MASS INDEX: 28.16 KG/M2 | TEMPERATURE: 97.3 F | SYSTOLIC BLOOD PRESSURE: 102 MMHG | WEIGHT: 153 LBS | HEIGHT: 62 IN | OXYGEN SATURATION: 97 % | DIASTOLIC BLOOD PRESSURE: 58 MMHG | RESPIRATION RATE: 18 BRPM | HEART RATE: 92 BPM

## 2024-11-08 DIAGNOSIS — B96.89 ACUTE BACTERIAL SINUSITIS: Primary | ICD-10-CM

## 2024-11-08 DIAGNOSIS — J01.90 ACUTE BACTERIAL SINUSITIS: Primary | ICD-10-CM

## 2024-11-08 RX ORDER — CEFDINIR 300 MG/1
300 CAPSULE ORAL 2 TIMES DAILY
Qty: 14 CAPSULE | Refills: 0 | Status: SHIPPED | OUTPATIENT
Start: 2024-11-08 | End: 2024-11-15

## 2024-11-08 NOTE — PROGRESS NOTES
Pancreatic Cancer Maternal Aunt      Labor Sister        Review of Systems:   Review of Systems - as above     Physical Exam   Vitals: BP (!) 102/58   Pulse 92   Temp 97.3 °F (36.3 °C)   Resp 18   Ht 1.575 m (5' 2\")   Wt 69.4 kg (153 lb)   SpO2 97%   Breastfeeding Yes   BMI 27.98 kg/m²   Physical Exam  Constitutional:       Appearance: She is well-developed.   HENT:      Head: Normocephalic.   Cardiovascular:      Rate and Rhythm: Normal rate and regular rhythm.      Heart sounds: Normal heart sounds. No murmur heard.  Pulmonary:      Effort: Pulmonary effort is normal. No respiratory distress.      Breath sounds: Normal breath sounds. No wheezing.   Abdominal:      General: Bowel sounds are normal.      Palpations: Abdomen is soft.   Musculoskeletal:         General: No tenderness. Normal range of motion.   Skin:     General: Skin is warm and dry.   Neurological:      Mental Status: She is alert and oriented to person, place, and time.   Psychiatric:         Behavior: Behavior normal.             Assessment and Plan       1. Acute bacterial sinusitis  New issue  -Symptoms and duration of symptoms concerning for bacterial infection, given she is breast feeding will trial omnicef, call office if no improvement or worsening  - cefdinir (OMNICEF) 300 MG capsule; Take 1 capsule by mouth 2 times daily for 7 days  Dispense: 14 capsule; Refill: 0      I am the primary care provider for this patient and provide the patient with continuity of care.       Counseled regarding above diagnosis, including possible risks and complications,  especially if left uncontrolled. Counseled regarding the possible side effects, risks, benefits and alternatives to treatment;patient and/or guardian verbalizes understanding, agrees, feels comfortable with and wishes to proceed with above treatment plan.    Call or go to EDmediately if symptoms worsen or persist. Advised patient to call with any new medication issues, and, as

## 2024-11-21 NOTE — PROGRESS NOTES
Requesting Physician:      CHIEF COMPLAINT:   Fatigue      PRIMARY HEMATOLOGIC/ONCOLOGIC DIAGNOSES:  JHON-1 Genotype 4G/5G   2.   vWD Type 2N     SECONDARY DIAGNOSES:  1. BRCA2 gene mutation positive   2. Miscarriages at 6 weeks x 2      HISTORY OF PRESENT ILLNESS:   Farhana Hughes is a 27yo female who is 7 weeks along in her pregnancy and presents for management of vWD and abnormal thrombophilia work-up.  The patient has a hx of vWD, type 2N. She had frequent epistaxis as a child. At age 9 s/p tonsillectomy she experienced severe bleeding. The diagnosis of vWD was suspected at the time ( no official diagnosis was made). At age 13 she was hospitalized with menorrhagia. Official diagnosis of vWD was made at the time. She has been followed historically through [facility] and treated w/ on demand Stimate & Tranexamic Acid. She had to be on OCPs for years. She has required Stimate prior to surgical procedures.   The patient has had 4 pregnancies. In 2021 her first pregnancy resulted in a miscarriage at 6 weeks. Her second pregnancy resulted in delivery of a healthy baby boy ( uncomplicated vaginal delivery, s/p DDAV prior). She was not on ASA at the time. Two months ago her third pregnancy resulted in a miscarriage at 6 wks. The patient is currently pregnant at 7 weeks gestation. She had minimal vaginal spotting that resolved yesterday. She is followed by OB and has an appointment w/ the high risk clinic in April. Latest HCG on 3/12/24 measured at 3064 ( up from 180.2 on 3/4/24).   Baseline testing done 9/27/23 showed F VIII activity 37%, vWF Ag 48%, vWF activity 30.   Due to miscarriages x 2 she was ordered a thrombophilia work-up which she completed 2/13/24. JHON-1 testing detected one copy each of the JHON-1 4G and 5G alleles (4G/5G).  The 4G allele is associated with increased JHON-1 plasma activity levels and confers a mild risk for

## 2024-11-22 ENCOUNTER — HOSPITAL ENCOUNTER (OUTPATIENT)
Dept: INFUSION THERAPY | Age: 29
Discharge: HOME | End: 2024-11-22
Payer: COMMERCIAL

## 2024-11-22 ENCOUNTER — OFFICE VISIT (OUTPATIENT)
Dept: ONCOLOGY | Age: 29
End: 2024-11-22
Payer: COMMERCIAL

## 2024-11-22 VITALS
DIASTOLIC BLOOD PRESSURE: 82 MMHG | WEIGHT: 152.4 LBS | OXYGEN SATURATION: 99 % | SYSTOLIC BLOOD PRESSURE: 102 MMHG | TEMPERATURE: 97.7 F | HEART RATE: 88 BPM | BODY MASS INDEX: 28.05 KG/M2 | HEIGHT: 62 IN | RESPIRATION RATE: 16 BRPM

## 2024-11-22 DIAGNOSIS — D68.00 VON WILLEBRAND'S DISEASE (HCC): Primary | ICD-10-CM

## 2024-11-22 DIAGNOSIS — D68.00 VON WILLEBRAND'S DISEASE (HCC): ICD-10-CM

## 2024-11-22 LAB
BASOPHILS # BLD: 0.03 K/UL (ref 0–0.2)
BASOPHILS NFR BLD: 1 % (ref 0–2)
EOSINOPHIL # BLD: 0.37 K/UL (ref 0.05–0.5)
EOSINOPHILS RELATIVE PERCENT: 6 % (ref 0–6)
ERYTHROCYTE [DISTWIDTH] IN BLOOD BY AUTOMATED COUNT: 13.2 % (ref 11.5–15)
FERRITIN SERPL-MCNC: 19 NG/ML
HCT VFR BLD AUTO: 40.8 % (ref 34–48)
HGB BLD-MCNC: 12.4 G/DL (ref 11.5–15.5)
IMM GRANULOCYTES # BLD AUTO: <0.03 K/UL (ref 0–0.58)
IMM GRANULOCYTES NFR BLD: 0 % (ref 0–5)
IRON SATN MFR SERPL: 16 % (ref 15–50)
IRON SERPL-MCNC: 65 UG/DL (ref 37–145)
LYMPHOCYTES NFR BLD: 2.48 K/UL (ref 1.5–4)
LYMPHOCYTES RELATIVE PERCENT: 40 % (ref 20–42)
MCH RBC QN AUTO: 26.2 PG (ref 26–35)
MCHC RBC AUTO-ENTMCNC: 30.4 G/DL (ref 32–34.5)
MCV RBC AUTO: 86.1 FL (ref 80–99.9)
MONOCYTES NFR BLD: 0.33 K/UL (ref 0.1–0.95)
MONOCYTES NFR BLD: 5 % (ref 2–12)
NEUTROPHILS NFR BLD: 48 % (ref 43–80)
NEUTS SEG NFR BLD: 3.01 K/UL (ref 1.8–7.3)
PLATELET # BLD AUTO: 270 K/UL (ref 130–450)
PMV BLD AUTO: 9.5 FL (ref 7–12)
RBC # BLD AUTO: 4.74 M/UL (ref 3.5–5.5)
TIBC SERPL-MCNC: 406 UG/DL (ref 250–450)
WBC OTHER # BLD: 6.2 K/UL (ref 4.5–11.5)

## 2024-11-22 PROCEDURE — 99213 OFFICE O/P EST LOW 20 MIN: CPT | Performed by: CLINICAL NURSE SPECIALIST

## 2024-11-22 PROCEDURE — 85025 COMPLETE CBC W/AUTO DIFF WBC: CPT

## 2024-11-22 PROCEDURE — 85240 CLOT FACTOR VIII AHG 1 STAGE: CPT

## 2024-11-22 PROCEDURE — 85245 CLOT FACTOR VIII VW RISTOCTN: CPT

## 2024-11-22 PROCEDURE — 82728 ASSAY OF FERRITIN: CPT

## 2024-11-22 PROCEDURE — 36415 COLL VENOUS BLD VENIPUNCTURE: CPT

## 2024-11-22 PROCEDURE — 85246 CLOT FACTOR VIII VW ANTIGEN: CPT

## 2024-11-22 PROCEDURE — 99212 OFFICE O/P EST SF 10 MIN: CPT

## 2024-11-22 PROCEDURE — 83540 ASSAY OF IRON: CPT

## 2024-11-22 PROCEDURE — 83550 IRON BINDING TEST: CPT

## 2024-11-25 ENCOUNTER — TELEPHONE (OUTPATIENT)
Dept: ONCOLOGY | Age: 29
End: 2024-11-25

## 2024-11-25 LAB
FACTOR VIII ACTIVITY: 55 % (ref 50–150)
FACTOR VIII ACTIVITY: 64 % (ref 56–191)
VWF AG ACT/NOR PPP IA: 66 % (ref 52–214)
VWF:RCO ACT/NOR PPP PL AGG: 58 % (ref 51–215)

## 2024-11-25 RX ORDER — FERROUS SULFATE 325(65) MG
325 TABLET ORAL
Qty: 60 TABLET | Refills: 1 | Status: SHIPPED | OUTPATIENT
Start: 2024-11-25

## 2024-11-25 NOTE — TELEPHONE ENCOUNTER
----- Message from EDWIN Colmenares sent at 11/25/2024  9:10 AM EST -----  Please call let her know iron was low I sent script for daily iron pill . F/u in 2 months with repeat labs, then follow up can be with any procedure or pregnancy ,thanks    11/25/24 6203  Patient notified of the above information, verbalized understanding.

## 2024-12-04 ENCOUNTER — TELEPHONE (OUTPATIENT)
Dept: ONCOLOGY | Age: 29
End: 2024-12-04

## 2024-12-04 RX ORDER — ACETAMINOPHEN 325 MG/1
650 TABLET ORAL
Status: CANCELLED | OUTPATIENT
Start: 2024-12-04

## 2024-12-04 RX ORDER — SODIUM CHLORIDE 9 MG/ML
INJECTION, SOLUTION INTRAVENOUS CONTINUOUS
Status: CANCELLED | OUTPATIENT
Start: 2024-12-04

## 2024-12-04 RX ORDER — ONDANSETRON 2 MG/ML
8 INJECTION INTRAMUSCULAR; INTRAVENOUS
Status: CANCELLED | OUTPATIENT
Start: 2024-12-04

## 2024-12-04 RX ORDER — ALBUTEROL SULFATE 90 UG/1
4 INHALANT RESPIRATORY (INHALATION) PRN
Status: CANCELLED | OUTPATIENT
Start: 2024-12-04

## 2024-12-04 RX ORDER — SODIUM CHLORIDE 9 MG/ML
5-250 INJECTION, SOLUTION INTRAVENOUS PRN
Status: CANCELLED | OUTPATIENT
Start: 2024-12-04

## 2024-12-04 RX ORDER — SODIUM CHLORIDE 0.9 % (FLUSH) 0.9 %
5-40 SYRINGE (ML) INJECTION PRN
Status: CANCELLED | OUTPATIENT
Start: 2024-12-04

## 2024-12-04 RX ORDER — EPINEPHRINE 1 MG/ML
0.3 INJECTION, SOLUTION, CONCENTRATE INTRAVENOUS PRN
Status: CANCELLED | OUTPATIENT
Start: 2024-12-04

## 2024-12-04 RX ORDER — HYDROCORTISONE SODIUM SUCCINATE 100 MG/2ML
100 INJECTION INTRAMUSCULAR; INTRAVENOUS
Status: CANCELLED | OUTPATIENT
Start: 2024-12-04

## 2024-12-04 RX ORDER — HEPARIN 100 UNIT/ML
500 SYRINGE INTRAVENOUS PRN
Status: CANCELLED | OUTPATIENT
Start: 2024-12-04

## 2024-12-04 RX ORDER — DIPHENHYDRAMINE HYDROCHLORIDE 50 MG/ML
50 INJECTION INTRAMUSCULAR; INTRAVENOUS
Status: CANCELLED | OUTPATIENT
Start: 2024-12-04

## 2024-12-04 NOTE — TELEPHONE ENCOUNTER
----- Message from EDWIN Colmenares sent at 12/3/2024  3:35 PM EST -----  Regarding: RE: PT QUESTION  Can you place a plan for her for iron infusion ?thanks  ----- Message -----  From: Osiris Chao  Sent: 12/3/2024   2:31 PM EST  To: EDWIN Garcia  Subject: PT QUESTION                                      PT CALLED , IRON PILLS CAUSING UPSET STOMACH , PT STATES SHE IS TAKING A STOOL SOFTENER.  HAS NAUSEA ALSO. WANTS TO KNOW IF INFUSION COULD BE AN OPTION.  PLEASE ADVISE

## 2024-12-16 ENCOUNTER — HOSPITAL ENCOUNTER (OUTPATIENT)
Dept: INFUSION THERAPY | Age: 29
Discharge: HOME | End: 2024-12-16
Payer: COMMERCIAL

## 2024-12-16 VITALS
OXYGEN SATURATION: 97 % | HEART RATE: 61 BPM | TEMPERATURE: 98.8 F | DIASTOLIC BLOOD PRESSURE: 59 MMHG | RESPIRATION RATE: 16 BRPM | SYSTOLIC BLOOD PRESSURE: 94 MMHG

## 2024-12-16 DIAGNOSIS — D68.00 VON WILLEBRAND'S DISEASE (HCC): Primary | ICD-10-CM

## 2024-12-16 PROCEDURE — 6360000002 HC RX W HCPCS: Performed by: CLINICAL NURSE SPECIALIST

## 2024-12-16 PROCEDURE — 96365 THER/PROPH/DIAG IV INF INIT: CPT

## 2024-12-16 PROCEDURE — 2580000003 HC RX 258: Performed by: CLINICAL NURSE SPECIALIST

## 2024-12-16 RX ORDER — SODIUM CHLORIDE 9 MG/ML
INJECTION, SOLUTION INTRAVENOUS CONTINUOUS
Status: CANCELLED | OUTPATIENT
Start: 2024-12-23

## 2024-12-16 RX ORDER — ALBUTEROL SULFATE 90 UG/1
4 INHALANT RESPIRATORY (INHALATION) PRN
Status: CANCELLED | OUTPATIENT
Start: 2024-12-23

## 2024-12-16 RX ORDER — SODIUM CHLORIDE 9 MG/ML
5-250 INJECTION, SOLUTION INTRAVENOUS PRN
Status: CANCELLED | OUTPATIENT
Start: 2024-12-23

## 2024-12-16 RX ORDER — SODIUM CHLORIDE 0.9 % (FLUSH) 0.9 %
5-40 SYRINGE (ML) INJECTION PRN
Status: DISCONTINUED | OUTPATIENT
Start: 2024-12-16 | End: 2024-12-17 | Stop reason: HOSPADM

## 2024-12-16 RX ORDER — ONDANSETRON 2 MG/ML
8 INJECTION INTRAMUSCULAR; INTRAVENOUS
Status: CANCELLED | OUTPATIENT
Start: 2024-12-23

## 2024-12-16 RX ORDER — HYDROCORTISONE SODIUM SUCCINATE 100 MG/2ML
100 INJECTION INTRAMUSCULAR; INTRAVENOUS
Status: CANCELLED | OUTPATIENT
Start: 2024-12-23

## 2024-12-16 RX ORDER — SODIUM CHLORIDE 0.9 % (FLUSH) 0.9 %
5-40 SYRINGE (ML) INJECTION PRN
Status: CANCELLED | OUTPATIENT
Start: 2024-12-23

## 2024-12-16 RX ORDER — ACETAMINOPHEN 325 MG/1
650 TABLET ORAL
Status: CANCELLED | OUTPATIENT
Start: 2024-12-23

## 2024-12-16 RX ORDER — HEPARIN 100 UNIT/ML
500 SYRINGE INTRAVENOUS PRN
Status: CANCELLED | OUTPATIENT
Start: 2024-12-23

## 2024-12-16 RX ORDER — DIPHENHYDRAMINE HYDROCHLORIDE 50 MG/ML
50 INJECTION INTRAMUSCULAR; INTRAVENOUS
Status: CANCELLED | OUTPATIENT
Start: 2024-12-23

## 2024-12-16 RX ORDER — EPINEPHRINE 1 MG/ML
0.3 INJECTION, SOLUTION, CONCENTRATE INTRAVENOUS PRN
Status: CANCELLED | OUTPATIENT
Start: 2024-12-23

## 2024-12-16 RX ADMIN — FERUMOXYTOL 510 MG: 510 INJECTION INTRAVENOUS at 15:07

## 2024-12-16 RX ADMIN — SODIUM CHLORIDE, PRESERVATIVE FREE 10 ML: 5 INJECTION INTRAVENOUS at 15:07

## 2024-12-27 ENCOUNTER — HOSPITAL ENCOUNTER (OUTPATIENT)
Dept: INFUSION THERAPY | Age: 29
Discharge: HOME | End: 2024-12-27
Payer: COMMERCIAL

## 2024-12-27 VITALS
SYSTOLIC BLOOD PRESSURE: 100 MMHG | TEMPERATURE: 98.4 F | RESPIRATION RATE: 16 BRPM | HEART RATE: 75 BPM | OXYGEN SATURATION: 96 % | DIASTOLIC BLOOD PRESSURE: 63 MMHG

## 2024-12-27 DIAGNOSIS — D68.00 VON WILLEBRAND'S DISEASE (HCC): Primary | ICD-10-CM

## 2024-12-27 PROCEDURE — 2500000003 HC RX 250 WO HCPCS: Performed by: CLINICAL NURSE SPECIALIST

## 2024-12-27 PROCEDURE — 2580000003 HC RX 258: Performed by: CLINICAL NURSE SPECIALIST

## 2024-12-27 PROCEDURE — 6360000002 HC RX W HCPCS: Performed by: CLINICAL NURSE SPECIALIST

## 2024-12-27 PROCEDURE — 96365 THER/PROPH/DIAG IV INF INIT: CPT

## 2024-12-27 RX ORDER — EPINEPHRINE 1 MG/ML
0.3 INJECTION, SOLUTION, CONCENTRATE INTRAVENOUS PRN
OUTPATIENT
Start: 2024-12-30

## 2024-12-27 RX ORDER — HEPARIN 100 UNIT/ML
500 SYRINGE INTRAVENOUS PRN
OUTPATIENT
Start: 2024-12-30

## 2024-12-27 RX ORDER — SODIUM CHLORIDE 0.9 % (FLUSH) 0.9 %
5-40 SYRINGE (ML) INJECTION PRN
OUTPATIENT
Start: 2024-12-30

## 2024-12-27 RX ORDER — SODIUM CHLORIDE 9 MG/ML
INJECTION, SOLUTION INTRAVENOUS CONTINUOUS
OUTPATIENT
Start: 2024-12-30

## 2024-12-27 RX ORDER — SODIUM CHLORIDE 0.9 % (FLUSH) 0.9 %
5-40 SYRINGE (ML) INJECTION PRN
Status: DISCONTINUED | OUTPATIENT
Start: 2024-12-27 | End: 2024-12-28 | Stop reason: HOSPADM

## 2024-12-27 RX ORDER — SODIUM CHLORIDE 9 MG/ML
5-250 INJECTION, SOLUTION INTRAVENOUS PRN
OUTPATIENT
Start: 2024-12-30

## 2024-12-27 RX ORDER — SODIUM CHLORIDE 9 MG/ML
5-250 INJECTION, SOLUTION INTRAVENOUS PRN
Status: DISCONTINUED | OUTPATIENT
Start: 2024-12-27 | End: 2024-12-28 | Stop reason: HOSPADM

## 2024-12-27 RX ORDER — ONDANSETRON 2 MG/ML
8 INJECTION INTRAMUSCULAR; INTRAVENOUS
OUTPATIENT
Start: 2024-12-30

## 2024-12-27 RX ORDER — HYDROCORTISONE SODIUM SUCCINATE 100 MG/2ML
100 INJECTION INTRAMUSCULAR; INTRAVENOUS
OUTPATIENT
Start: 2024-12-30

## 2024-12-27 RX ORDER — DIPHENHYDRAMINE HYDROCHLORIDE 50 MG/ML
50 INJECTION INTRAMUSCULAR; INTRAVENOUS
OUTPATIENT
Start: 2024-12-30

## 2024-12-27 RX ORDER — ALBUTEROL SULFATE 90 UG/1
4 INHALANT RESPIRATORY (INHALATION) PRN
OUTPATIENT
Start: 2024-12-30

## 2024-12-27 RX ORDER — ACETAMINOPHEN 325 MG/1
650 TABLET ORAL
OUTPATIENT
Start: 2024-12-30

## 2024-12-27 RX ADMIN — SODIUM CHLORIDE, PRESERVATIVE FREE 10 ML: 5 INJECTION INTRAVENOUS at 14:04

## 2024-12-27 RX ADMIN — FERUMOXYTOL 510 MG: 510 INJECTION INTRAVENOUS at 14:15

## 2025-01-31 DIAGNOSIS — D68.00 VON WILLEBRAND'S DISEASE (HCC): ICD-10-CM

## 2025-01-31 DIAGNOSIS — D50.8 OTHER IRON DEFICIENCY ANEMIA: Primary | ICD-10-CM

## 2025-02-17 ENCOUNTER — HOSPITAL ENCOUNTER (OUTPATIENT)
Age: 30
Discharge: HOME | End: 2025-02-17
Payer: COMMERCIAL

## 2025-02-17 DIAGNOSIS — D68.00 VON WILLEBRAND'S DISEASE (HCC): ICD-10-CM

## 2025-02-17 DIAGNOSIS — D50.8 OTHER IRON DEFICIENCY ANEMIA: ICD-10-CM

## 2025-02-17 LAB
FERRITIN SERPL-MCNC: 388 NG/ML
IRON SATN MFR SERPL: 30 % (ref 15–50)
IRON SERPL-MCNC: 86 UG/DL (ref 37–145)
TIBC SERPL-MCNC: 289 UG/DL (ref 250–450)

## 2025-02-17 PROCEDURE — 85245 CLOT FACTOR VIII VW RISTOCTN: CPT

## 2025-02-17 PROCEDURE — 36415 COLL VENOUS BLD VENIPUNCTURE: CPT

## 2025-02-17 PROCEDURE — 85240 CLOT FACTOR VIII AHG 1 STAGE: CPT

## 2025-02-17 PROCEDURE — 82728 ASSAY OF FERRITIN: CPT

## 2025-02-17 PROCEDURE — 83550 IRON BINDING TEST: CPT

## 2025-02-17 PROCEDURE — 83540 ASSAY OF IRON: CPT

## 2025-02-17 PROCEDURE — 85246 CLOT FACTOR VIII VW ANTIGEN: CPT

## 2025-02-20 LAB — FACTOR VIII ACTIVITY: 55 % (ref 50–150)

## 2025-02-21 ENCOUNTER — OFFICE VISIT (OUTPATIENT)
Dept: ONCOLOGY | Age: 30
End: 2025-02-21

## 2025-02-21 ENCOUNTER — HOSPITAL ENCOUNTER (OUTPATIENT)
Dept: INFUSION THERAPY | Age: 30
End: 2025-02-21

## 2025-02-21 VITALS
OXYGEN SATURATION: 100 % | HEIGHT: 62 IN | DIASTOLIC BLOOD PRESSURE: 82 MMHG | BODY MASS INDEX: 27.42 KG/M2 | SYSTOLIC BLOOD PRESSURE: 111 MMHG | TEMPERATURE: 98.7 F | HEART RATE: 94 BPM | WEIGHT: 149 LBS

## 2025-02-21 DIAGNOSIS — D68.00 VON WILLEBRAND'S DISEASE (HCC): Primary | ICD-10-CM

## 2025-02-21 LAB
VWF AG ACT/NOR PPP IA: 65 % (ref 52–214)
VWF:RCO ACT/NOR PPP PL AGG: 61 % (ref 51–215)

## 2025-02-21 NOTE — PROGRESS NOTES
Requesting Physician:      CHIEF COMPLAINT:   Fatigue      PRIMARY HEMATOLOGIC/ONCOLOGIC DIAGNOSES:  JHON-1 Genotype 4G/5G   2.   vWD Type 2N     SECONDARY DIAGNOSES:  1. BRCA2 gene mutation positive   2. Miscarriages at 6 weeks x 2      HISTORY OF PRESENT ILLNESS:   Farhana Hughes is a 29yo female who is 7 weeks along in her pregnancy and presents for management of vWD and abnormal thrombophilia work-up.  The patient has a hx of vWD, type 2N. She had frequent epistaxis as a child. At age 9 s/p tonsillectomy she experienced severe bleeding. The diagnosis of vWD was suspected at the time ( no official diagnosis was made). At age 13 she was hospitalized with menorrhagia. Official diagnosis of vWD was made at the time. She has been followed historically through [facility] and treated w/ on demand Stimate & Tranexamic Acid. She had to be on OCPs for years. She has required Stimate prior to surgical procedures.   The patient has had 4 pregnancies. In 2021 her first pregnancy resulted in a miscarriage at 6 weeks. Her second pregnancy resulted in delivery of a healthy baby boy ( uncomplicated vaginal delivery, s/p DDAV prior). She was not on ASA at the time. Two months ago her third pregnancy resulted in a miscarriage at 6 wks. The patient is currently pregnant at 7 weeks gestation. She had minimal vaginal spotting that resolved yesterday. She is followed by OB and has an appointment w/ the high risk clinic in April. Latest HCG on 3/12/24 measured at 3064 ( up from 180.2 on 3/4/24).   Baseline testing done 9/27/23 showed F VIII activity 37%, vWF Ag 48%, vWF activity 30.   Due to miscarriages x 2 she was ordered a thrombophilia work-up which she completed 2/13/24. JHON-1 testing detected one copy each of the JHON-1 4G and 5G alleles (4G/5G).  The 4G allele is associated with increased JHON-1 plasma activity levels and confers a mild risk for

## 2025-03-07 NOTE — PROGRESS NOTES
Nicole Reyes, DO [facility]  [address] [place] [address]  [phone]    HISTORY & PHYSICAL N4OTE     REFERRED BY:  Jody Love APRN - *  REASON FOR TODAY'S VISIT:    Chief Complaint   Patient presents with    BREAST - HIGH RISK     High Risk BRCA2+--4 siblings and 1 niece   Patient had inconclusive mammogram in January (patient breast feeding at the time and currently)   Patient denies any pain or mass at this time        SUBJECTIVE     HPI:  Farhana Hughes is a 29 y.o. female who presents for high risk evaluation in setting of BRCA2 mutation.     Very pleasant 29-year-old female.     Complex medical history of von Willebrand's deficiency type II N,JHON-1 Genotype 4G/5G, both of which she follows with medical oncology here.  She has had bleeding complications in the past.  History of atypical nevus excision of her left leg as a teenager requiring excision.    She was following with the high risk clinic at [facility] in [place].  She has family history of breast and ovarian cancer.  She has 6 sisters.  1 sister diagnosed with breast cancer at age 50 and BRCA2 mutation was identified.  Second sister  of an overdose and was found to have ovarian cancer and autopsy at age 43.  Maternal aunt with breast cancer age 58.  Father and paternal grandfather with prostate cancer in their 60s.    She has recently moved to [place] and is still breast feeding. Baby girl is 4 months. Patient ideally would like to breast feed for at least around a year.     Most recent breast imaging with a bilateral screening mammogram was done on 2025 at [facility].  This showed lactational changes within the breast, no suspicious masses, architectural distortions or microcalcifications are identified.    Last MRI was done at [facility] on 2025 which was nondiagnostic due to severe background enhancement as the patient is currently nursing.  There is signal loss through anterior aspect of left

## 2025-03-10 ENCOUNTER — OFFICE VISIT (OUTPATIENT)
Dept: BREAST CENTER | Age: 30
End: 2025-03-10
Payer: COMMERCIAL

## 2025-03-10 VITALS
DIASTOLIC BLOOD PRESSURE: 80 MMHG | TEMPERATURE: 98.3 F | SYSTOLIC BLOOD PRESSURE: 120 MMHG | WEIGHT: 144 LBS | BODY MASS INDEX: 26.5 KG/M2 | RESPIRATION RATE: 16 BRPM | OXYGEN SATURATION: 98 % | HEART RATE: 73 BPM | HEIGHT: 62 IN

## 2025-03-10 DIAGNOSIS — Z15.01 BRCA GENE POSITIVE: Primary | ICD-10-CM

## 2025-03-10 DIAGNOSIS — Z91.89 AT HIGH RISK FOR BREAST CANCER: ICD-10-CM

## 2025-03-10 DIAGNOSIS — Z80.3 FAMILY HISTORY OF BREAST CANCER: ICD-10-CM

## 2025-03-10 DIAGNOSIS — Z15.09 BRCA GENE POSITIVE: Primary | ICD-10-CM

## 2025-03-10 PROCEDURE — 99203 OFFICE O/P NEW LOW 30 MIN: CPT | Performed by: STUDENT IN AN ORGANIZED HEALTH CARE EDUCATION/TRAINING PROGRAM

## 2025-03-10 PROCEDURE — 99204 OFFICE O/P NEW MOD 45 MIN: CPT | Performed by: STUDENT IN AN ORGANIZED HEALTH CARE EDUCATION/TRAINING PROGRAM

## 2025-06-10 DIAGNOSIS — D68.00 VON WILLEBRAND'S DISEASE (HCC): Primary | Chronic | ICD-10-CM

## 2025-06-27 ENCOUNTER — E-VISIT (OUTPATIENT)
Dept: FAMILY MEDICINE CLINIC | Age: 30
End: 2025-06-27
Payer: COMMERCIAL

## 2025-06-27 DIAGNOSIS — H92.09 OTALGIA, UNSPECIFIED LATERALITY: Primary | ICD-10-CM

## 2025-06-27 PROCEDURE — 99422 OL DIG E/M SVC 11-20 MIN: CPT | Performed by: NURSE PRACTITIONER

## 2025-06-27 RX ORDER — AMOXICILLIN 500 MG/1
500 CAPSULE ORAL 3 TIMES DAILY
Qty: 21 CAPSULE | Refills: 0 | Status: SHIPPED | OUTPATIENT
Start: 2025-06-27 | End: 2025-07-04

## 2025-06-27 ASSESSMENT — LIFESTYLE VARIABLES: SMOKING_STATUS: NO, I'VE NEVER SMOKED

## 2025-07-10 ENCOUNTER — OFFICE VISIT (OUTPATIENT)
Age: 30
End: 2025-07-10

## 2025-07-10 DIAGNOSIS — Z15.01 BRCA GENE POSITIVE: Primary | ICD-10-CM

## 2025-07-10 DIAGNOSIS — Z91.89 AT HIGH RISK FOR BREAST CANCER: ICD-10-CM

## 2025-07-10 DIAGNOSIS — Z15.09 BRCA GENE POSITIVE: Primary | ICD-10-CM

## 2025-07-10 NOTE — PROGRESS NOTES
Telephone visit with patient to determine if she is still breast feeding. Patient states she still is actively breastfeeding. She is BRCA2 positive and due for 6 month imaging. Her last imaging was non-diagnostic due to the lactational changes and recommendations were made to get repeat imaging when she was done breast feeding. Since she is due for imaging and is not done breast feeding, we will plan to get bilateral screening ultrasounds in the mean time. I will call her with results and plan for a 6 month follow up with me around September.

## 2025-07-12 ENCOUNTER — HOSPITAL ENCOUNTER (EMERGENCY)
Age: 30
Discharge: HOME | End: 2025-07-12
Attending: EMERGENCY MEDICINE
Payer: COMMERCIAL

## 2025-07-12 ENCOUNTER — APPOINTMENT (OUTPATIENT)
Dept: ULTRASOUND IMAGING | Age: 30
End: 2025-07-12
Payer: COMMERCIAL

## 2025-07-12 VITALS
SYSTOLIC BLOOD PRESSURE: 115 MMHG | HEIGHT: 62 IN | HEART RATE: 87 BPM | OXYGEN SATURATION: 100 % | WEIGHT: 143 LBS | BODY MASS INDEX: 26.31 KG/M2 | TEMPERATURE: 98.1 F | RESPIRATION RATE: 15 BRPM | DIASTOLIC BLOOD PRESSURE: 80 MMHG

## 2025-07-12 DIAGNOSIS — Z86.2 HISTORY OF VON WILLEBRAND'S DISEASE: ICD-10-CM

## 2025-07-12 DIAGNOSIS — N93.9 VAGINAL BLEEDING: ICD-10-CM

## 2025-07-12 DIAGNOSIS — N30.01 ACUTE CYSTITIS WITH HEMATURIA: Primary | ICD-10-CM

## 2025-07-12 LAB
ABO/RH: NORMAL
ALBUMIN SERPL-MCNC: 4.3 G/DL (ref 3.5–5.2)
ALP SERPL-CCNC: 84 U/L (ref 35–104)
ALT SERPL-CCNC: 15 U/L (ref 0–35)
ANION GAP SERPL CALCULATED.3IONS-SCNC: 10 MMOL/L (ref 7–16)
ANTIBODY SCREEN: NEGATIVE
ARM BAND NUMBER: NORMAL
AST SERPL-CCNC: 21 U/L (ref 0–35)
B-HCG SERPL EIA 3RD IS-ACNC: <0.2 MIU/ML (ref 0–7)
BASOPHILS # BLD: 0.04 K/UL (ref 0–0.2)
BASOPHILS NFR BLD: 1 % (ref 0–2)
BILIRUB DIRECT SERPL-MCNC: 0.2 MG/DL (ref 0–0.2)
BILIRUB INDIRECT SERPL-MCNC: 0.2 MG/DL (ref 0–1)
BILIRUB SERPL-MCNC: 0.4 MG/DL (ref 0–1.2)
BILIRUB UR QL STRIP: ABNORMAL
BLOOD BANK DISPENSE STATUS: NORMAL
BLOOD BANK SAMPLE EXPIRATION: NORMAL
BPU ID: NORMAL
BUN SERPL-MCNC: 14 MG/DL (ref 6–20)
CALCIUM SERPL-MCNC: 9.3 MG/DL (ref 8.6–10)
CHLORIDE SERPL-SCNC: 108 MMOL/L (ref 98–107)
CLARITY UR: ABNORMAL
CO2 SERPL-SCNC: 25 MMOL/L (ref 22–29)
COLOR UR: ABNORMAL
COMPONENT: NORMAL
CREAT SERPL-MCNC: 0.6 MG/DL (ref 0.5–1)
CROSSMATCH RESULT: NORMAL
EOSINOPHIL # BLD: 0.19 K/UL (ref 0.05–0.5)
EOSINOPHILS RELATIVE PERCENT: 3 % (ref 0–6)
EPI CELLS #/AREA URNS HPF: ABNORMAL /HPF
ERYTHROCYTE [DISTWIDTH] IN BLOOD BY AUTOMATED COUNT: 12.7 % (ref 11.5–15)
GFR, ESTIMATED: >90 ML/MIN/1.73M2
GLUCOSE SERPL-MCNC: 65 MG/DL (ref 74–99)
GLUCOSE UR STRIP-MCNC: ABNORMAL MG/DL
HCT VFR BLD AUTO: 40.5 % (ref 34–48)
HCT VFR BLD AUTO: 41.1 % (ref 34–48)
HGB BLD-MCNC: 12.9 G/DL (ref 11.5–15.5)
HGB BLD-MCNC: 13.6 G/DL (ref 11.5–15.5)
HGB UR QL STRIP.AUTO: ABNORMAL
IMM GRANULOCYTES # BLD AUTO: <0.03 K/UL (ref 0–0.58)
IMM GRANULOCYTES NFR BLD: 0 % (ref 0–5)
KETONES UR STRIP-MCNC: ABNORMAL MG/DL
LACTATE BLDV-SCNC: 1 MMOL/L (ref 0.5–2.2)
LEUKOCYTE ESTERASE UR QL STRIP: ABNORMAL
LIPASE SERPL-CCNC: 58 U/L (ref 13–60)
LYMPHOCYTES NFR BLD: 2.01 K/UL (ref 1.5–4)
LYMPHOCYTES RELATIVE PERCENT: 35 % (ref 20–42)
MAGNESIUM SERPL-MCNC: 1.8 MG/DL (ref 1.6–2.6)
MCH RBC QN AUTO: 28.6 PG (ref 26–35)
MCHC RBC AUTO-ENTMCNC: 33.1 G/DL (ref 32–34.5)
MCV RBC AUTO: 86.5 FL (ref 80–99.9)
MONOCYTES NFR BLD: 0.49 K/UL (ref 0.1–0.95)
MONOCYTES NFR BLD: 9 % (ref 2–12)
NEUTROPHILS NFR BLD: 52 % (ref 43–80)
NEUTS SEG NFR BLD: 3.02 K/UL (ref 1.8–7.3)
NITRITE UR QL STRIP: ABNORMAL
PH UR STRIP: ABNORMAL [PH] (ref 5–8)
PLATELET # BLD AUTO: 251 K/UL (ref 130–450)
PMV BLD AUTO: 9.8 FL (ref 7–12)
POTASSIUM SERPL-SCNC: 3.9 MMOL/L (ref 3.5–5.1)
PROT SERPL-MCNC: 6.9 G/DL (ref 6.4–8.3)
PROT UR STRIP-MCNC: ABNORMAL MG/DL
RBC # BLD AUTO: 4.75 M/UL (ref 3.5–5.5)
RBC #/AREA URNS HPF: ABNORMAL /HPF
SODIUM SERPL-SCNC: 142 MMOL/L (ref 136–145)
SP GR UR STRIP: ABNORMAL (ref 1–1.03)
TRANSFUSION STATUS: NORMAL
UNIT DIVISION: 0
UROBILINOGEN UR STRIP-ACNC: ABNORMAL EU/DL (ref 0–1)
WBC #/AREA URNS HPF: ABNORMAL /HPF
WBC OTHER # BLD: 5.8 K/UL (ref 4.5–11.5)

## 2025-07-12 PROCEDURE — 86922 COMPATIBILITY TEST ANTIGLOB: CPT

## 2025-07-12 PROCEDURE — 83690 ASSAY OF LIPASE: CPT

## 2025-07-12 PROCEDURE — 86902 BLOOD TYPE ANTIGEN DONOR EA: CPT

## 2025-07-12 PROCEDURE — 96375 TX/PRO/DX INJ NEW DRUG ADDON: CPT

## 2025-07-12 PROCEDURE — 85014 HEMATOCRIT: CPT

## 2025-07-12 PROCEDURE — 85025 COMPLETE CBC W/AUTO DIFF WBC: CPT

## 2025-07-12 PROCEDURE — 99284 EMERGENCY DEPT VISIT MOD MDM: CPT

## 2025-07-12 PROCEDURE — 82248 BILIRUBIN DIRECT: CPT

## 2025-07-12 PROCEDURE — 96365 THER/PROPH/DIAG IV INF INIT: CPT

## 2025-07-12 PROCEDURE — 83735 ASSAY OF MAGNESIUM: CPT

## 2025-07-12 PROCEDURE — 81001 URINALYSIS AUTO W/SCOPE: CPT

## 2025-07-12 PROCEDURE — 86900 BLOOD TYPING SEROLOGIC ABO: CPT

## 2025-07-12 PROCEDURE — 86901 BLOOD TYPING SEROLOGIC RH(D): CPT

## 2025-07-12 PROCEDURE — 85018 HEMOGLOBIN: CPT

## 2025-07-12 PROCEDURE — 80053 COMPREHEN METABOLIC PANEL: CPT

## 2025-07-12 PROCEDURE — 86850 RBC ANTIBODY SCREEN: CPT

## 2025-07-12 PROCEDURE — 86920 COMPATIBILITY TEST SPIN: CPT

## 2025-07-12 PROCEDURE — 83605 ASSAY OF LACTIC ACID: CPT

## 2025-07-12 PROCEDURE — 2580000003 HC RX 258

## 2025-07-12 PROCEDURE — 87086 URINE CULTURE/COLONY COUNT: CPT

## 2025-07-12 PROCEDURE — 6360000002 HC RX W HCPCS

## 2025-07-12 PROCEDURE — 84702 CHORIONIC GONADOTROPIN TEST: CPT

## 2025-07-12 PROCEDURE — 2500000003 HC RX 250 WO HCPCS

## 2025-07-12 PROCEDURE — 76830 TRANSVAGINAL US NON-OB: CPT

## 2025-07-12 RX ORDER — CEFDINIR 300 MG/1
300 CAPSULE ORAL 2 TIMES DAILY
Qty: 10 CAPSULE | Refills: 0 | Status: SHIPPED | OUTPATIENT
Start: 2025-07-12 | End: 2025-07-17

## 2025-07-12 RX ORDER — DESMOPRESSIN ACETATE 4 UG/ML
1 INJECTION, SOLUTION INTRAVENOUS; SUBCUTANEOUS ONCE
Status: DISCONTINUED | OUTPATIENT
Start: 2025-07-12 | End: 2025-07-12

## 2025-07-12 RX ORDER — 0.9 % SODIUM CHLORIDE 0.9 %
1000 INTRAVENOUS SOLUTION INTRAVENOUS ONCE
Status: COMPLETED | OUTPATIENT
Start: 2025-07-12 | End: 2025-07-12

## 2025-07-12 RX ADMIN — DESMOPRESSIN ACETATE 19.5 MCG: 4 INJECTION, SOLUTION INTRAVENOUS; SUBCUTANEOUS at 13:14

## 2025-07-12 RX ADMIN — WATER 1000 MG: 1 INJECTION INTRAMUSCULAR; INTRAVENOUS; SUBCUTANEOUS at 13:13

## 2025-07-12 RX ADMIN — SODIUM CHLORIDE 1000 ML: 0.9 INJECTION, SOLUTION INTRAVENOUS at 11:23

## 2025-07-12 ASSESSMENT — PAIN DESCRIPTION - ORIENTATION: ORIENTATION: LOWER

## 2025-07-12 ASSESSMENT — PAIN SCALES - GENERAL: PAINLEVEL_OUTOF10: 2

## 2025-07-12 ASSESSMENT — PAIN DESCRIPTION - LOCATION: LOCATION: ABDOMEN

## 2025-07-12 ASSESSMENT — PAIN - FUNCTIONAL ASSESSMENT: PAIN_FUNCTIONAL_ASSESSMENT: PREVENTS OR INTERFERES SOME ACTIVE ACTIVITIES AND ADLS

## 2025-07-12 ASSESSMENT — PAIN DESCRIPTION - PAIN TYPE: TYPE: ACUTE PAIN

## 2025-07-12 ASSESSMENT — PAIN DESCRIPTION - DESCRIPTORS: DESCRIPTORS: CRAMPING

## 2025-07-12 NOTE — ED NOTES
Patient ambulated to and from bathroom and around room. Does not express any feelings of dizziness or lightheadedness. No complaints at this time.

## 2025-07-12 NOTE — DISCHARGE INSTRUCTIONS
Please return to the ER for any new or worsening symptoms including but not limited to Fever, Chest pain or difficulty breathing, Inability to keep down liquids, Worsening abdominal pain, stool that appears bloody or dark/tarry, or any other concerning symptoms  If prescribed, please be sure to  your prescriptions from the pharmacy  Please follow-up with Primary care provider and OB/GYN as instructed

## 2025-07-13 LAB
MICROORGANISM SPEC CULT: ABNORMAL
SERVICE CMNT-IMP: ABNORMAL
SPECIMEN DESCRIPTION: ABNORMAL

## 2025-07-13 NOTE — ED PROVIDER NOTES
Department of Emergency Medicine     Written by: Josep Chambers MD  Patient Name: Farhana Hughes  Visit Date: 2025 10:54 AM  MRN: [id number]                   : 1995  ------------------------- CC-------------------------  Chief Complaint   Patient presents with    Vaginal Bleeding     started first mentrual cycle 8 months postpartum, saturating through pads every 30 minutes, hx of transfusions    Abdominal Cramping     ------------------------- HPI -------------------------    Farhana is a 30 y.o. year old female with history of von Willebrand's disease, currently postpartum 8 months presents from home via private vehicle for vaginal bleeding.  Patient reports that she had her first period today after delivery.  She was spotting yesterday, the bleeding became worse last night and this morning.  Since this morning around 8:00, she has saturated through 8 pads, 1 pad every 30 minutes.  Reports mild clots, denies any abdominal pain or lower groin cramping, no excessive vaginal discharge.    Denies chest pain shortness of breath, reports mild dizziness she reports she has not drink much fluids today.  Denies any other symptoms.    Reports that she was diagnosed with von Willebrand disease as a child when she had excessive bleeding after dental procedure and nosebleeds.  Reports in the past she has gotten DDAVP prior to surgeries and has gotten transfusions in the past.     There are no family/friends at bedside. History is provided by patient who is felt to be a good historian.     Nursing notes were all reviewed and agreed with or any disagreements were addressed in the HPI.    REVIEW OF SYSTEMS:  Review of Systems:   Please see HPI above. All bolded are positive. All un-bolded are negative.  Constitutional Symptoms: fever, chills, fatigue, generalized weakness, diaphoresis, increase in thirst, loss of appetite  Eyes: vision change   Ears, Nose, Mouth, Throat: hearing loss, nasal congestion, sores in the

## 2025-07-14 DIAGNOSIS — D68.00 VON WILLEBRAND'S DISEASE (HCC): Primary | ICD-10-CM

## 2025-07-16 LAB
ABO/RH: NORMAL
ANTIBODY SCREEN: NEGATIVE
ARM BAND NUMBER: NORMAL
BLOOD BANK DISPENSE STATUS: NORMAL
BLOOD BANK SAMPLE EXPIRATION: NORMAL
BPU ID: NORMAL
COMPONENT: NORMAL
CROSSMATCH RESULT: NORMAL
TRANSFUSION STATUS: NORMAL
UNIT DIVISION: 0

## 2025-07-17 ENCOUNTER — HOSPITAL ENCOUNTER (OUTPATIENT)
Dept: INFUSION THERAPY | Age: 30
Discharge: HOME | End: 2025-07-17
Payer: COMMERCIAL

## 2025-07-17 ENCOUNTER — OFFICE VISIT (OUTPATIENT)
Age: 30
End: 2025-07-17
Payer: COMMERCIAL

## 2025-07-17 VITALS
SYSTOLIC BLOOD PRESSURE: 114 MMHG | HEIGHT: 62 IN | BODY MASS INDEX: 25.95 KG/M2 | OXYGEN SATURATION: 100 % | HEART RATE: 76 BPM | TEMPERATURE: 98.2 F | WEIGHT: 141 LBS | DIASTOLIC BLOOD PRESSURE: 77 MMHG

## 2025-07-17 DIAGNOSIS — D68.00 VON WILLEBRAND'S DISEASE (HCC): Primary | ICD-10-CM

## 2025-07-17 DIAGNOSIS — D68.00 VON WILLEBRAND'S DISEASE (HCC): Chronic | ICD-10-CM

## 2025-07-17 LAB
ALBUMIN SERPL-MCNC: 4.5 G/DL (ref 3.5–5.2)
ALP SERPL-CCNC: 95 U/L (ref 35–104)
ALT SERPL-CCNC: 16 U/L (ref 0–35)
ANION GAP SERPL CALCULATED.3IONS-SCNC: 12 MMOL/L (ref 7–16)
AST SERPL-CCNC: 23 U/L (ref 0–35)
BASOPHILS # BLD: 0.03 K/UL (ref 0–0.2)
BASOPHILS NFR BLD: 1 % (ref 0–2)
BILIRUB SERPL-MCNC: 0.3 MG/DL (ref 0–1.2)
BUN SERPL-MCNC: 13 MG/DL (ref 6–20)
CALCIUM SERPL-MCNC: 9.5 MG/DL (ref 8.6–10)
CHLORIDE SERPL-SCNC: 105 MMOL/L (ref 98–107)
CO2 SERPL-SCNC: 25 MMOL/L (ref 22–29)
CREAT SERPL-MCNC: 0.7 MG/DL (ref 0.5–1)
EOSINOPHIL # BLD: 0.16 K/UL (ref 0.05–0.5)
EOSINOPHILS RELATIVE PERCENT: 3 % (ref 0–6)
ERYTHROCYTE [DISTWIDTH] IN BLOOD BY AUTOMATED COUNT: 12.6 % (ref 11.5–15)
FERRITIN SERPL-MCNC: 226 NG/ML
GFR, ESTIMATED: >90 ML/MIN/1.73M2
GLUCOSE SERPL-MCNC: 87 MG/DL (ref 74–99)
HCT VFR BLD AUTO: 44.4 % (ref 34–48)
HGB BLD-MCNC: 14 G/DL (ref 11.5–15.5)
IMM GRANULOCYTES # BLD AUTO: <0.03 K/UL (ref 0–0.58)
IMM GRANULOCYTES NFR BLD: 0 % (ref 0–5)
IRON SATN MFR SERPL: 37 % (ref 15–50)
IRON SERPL-MCNC: 107 UG/DL (ref 37–145)
LYMPHOCYTES NFR BLD: 2.11 K/UL (ref 1.5–4)
LYMPHOCYTES RELATIVE PERCENT: 36 % (ref 20–42)
MCH RBC QN AUTO: 28 PG (ref 26–35)
MCHC RBC AUTO-ENTMCNC: 31.5 G/DL (ref 32–34.5)
MCV RBC AUTO: 88.8 FL (ref 80–99.9)
MONOCYTES NFR BLD: 0.35 K/UL (ref 0.1–0.95)
MONOCYTES NFR BLD: 6 % (ref 2–12)
NEUTROPHILS NFR BLD: 55 % (ref 43–80)
NEUTS SEG NFR BLD: 3.2 K/UL (ref 1.8–7.3)
PLATELET # BLD AUTO: 294 K/UL (ref 130–450)
PMV BLD AUTO: 10 FL (ref 7–12)
POTASSIUM SERPL-SCNC: 4.1 MMOL/L (ref 3.5–5.1)
PROT SERPL-MCNC: 7.3 G/DL (ref 6.4–8.3)
RBC # BLD AUTO: 5 M/UL (ref 3.5–5.5)
SODIUM SERPL-SCNC: 141 MMOL/L (ref 136–145)
TIBC SERPL-MCNC: 288 UG/DL (ref 250–450)
WBC OTHER # BLD: 5.9 K/UL (ref 4.5–11.5)

## 2025-07-17 PROCEDURE — 83550 IRON BINDING TEST: CPT

## 2025-07-17 PROCEDURE — 83540 ASSAY OF IRON: CPT

## 2025-07-17 PROCEDURE — 80053 COMPREHEN METABOLIC PANEL: CPT

## 2025-07-17 PROCEDURE — 99214 OFFICE O/P EST MOD 30 MIN: CPT | Performed by: CLINICAL NURSE SPECIALIST

## 2025-07-17 PROCEDURE — 85025 COMPLETE CBC W/AUTO DIFF WBC: CPT

## 2025-07-17 PROCEDURE — 82728 ASSAY OF FERRITIN: CPT

## 2025-07-17 PROCEDURE — 36415 COLL VENOUS BLD VENIPUNCTURE: CPT

## 2025-07-17 RX ORDER — TRANEXAMIC ACID 650 MG/1
1300 TABLET ORAL 3 TIMES DAILY
Qty: 30 TABLET | Refills: 0 | Status: SHIPPED | OUTPATIENT
Start: 2025-07-17

## 2025-07-17 NOTE — PROGRESS NOTES
CHIEF COMPLAINT:   Fatigue      PRIMARY HEMATOLOGIC/ONCOLOGIC DIAGNOSES:  JHON-1 Genotype 4G/5G   2.   vWD Type 2N     SECONDARY DIAGNOSES:  1. BRCA2 gene mutation positive   2. Miscarriages at 6 weeks x 2      HISTORY OF PRESENT ILLNESS:   Farhana Hughes is a 29yo female who is 7 weeks along in her pregnancy and presents for management of vWD and abnormal thrombophilia work-up.  The patient has a hx of vWD, type 2N. She had frequent epistaxis as a child. At age 9 s/p tonsillectomy she experienced severe bleeding. The diagnosis of vWD was suspected at the time ( no official diagnosis was made). At age 13 she was hospitalized with menorrhagia. Official diagnosis of vWD was made at the time. She has been followed historically through [facility] and treated w/ on demand Stimate & Tranexamic Acid. She had to be on OCPs for years. She has required Stimate prior to surgical procedures.   The patient has had 4 pregnancies. In 2021 her first pregnancy resulted in a miscarriage at 6 weeks. Her second pregnancy resulted in delivery of a healthy baby boy ( uncomplicated vaginal delivery, s/p DDAV prior). She was not on ASA at the time. Two months ago her third pregnancy resulted in a miscarriage at 6 wks. The patient is currently pregnant at 7 weeks gestation. She had minimal vaginal spotting that resolved yesterday. She is followed by OB and has an appointment w/ the high risk clinic in April. Latest HCG on 3/12/24 measured at 3064 ( up from 180.2 on 3/4/24).   Baseline testing done 9/27/23 showed F VIII activity 37%, vWF Ag 48%, vWF activity 30.   Due to miscarriages x 2 she was ordered a thrombophilia work-up which she completed 2/13/24. JHON-1 testing detected one copy each of the JHON-1 4G and 5G alleles (4G/5G).  The 4G allele is associated with increased JHON-1 plasma activity levels and confers a mild risk for venous thromboembolism